# Patient Record
Sex: FEMALE | Race: WHITE | NOT HISPANIC OR LATINO | Employment: PART TIME | URBAN - METROPOLITAN AREA
[De-identification: names, ages, dates, MRNs, and addresses within clinical notes are randomized per-mention and may not be internally consistent; named-entity substitution may affect disease eponyms.]

---

## 2022-04-22 ENCOUNTER — OFFICE VISIT (OUTPATIENT)
Dept: OBGYN CLINIC | Facility: CLINIC | Age: 22
End: 2022-04-22
Payer: COMMERCIAL

## 2022-04-22 VITALS — DIASTOLIC BLOOD PRESSURE: 82 MMHG | SYSTOLIC BLOOD PRESSURE: 118 MMHG | WEIGHT: 169 LBS

## 2022-04-22 DIAGNOSIS — Z30.011 ENCOUNTER FOR INITIAL PRESCRIPTION OF CONTRACEPTIVE PILLS: Primary | ICD-10-CM

## 2022-04-22 PROCEDURE — 99241 PR OFFICE CONSULTATION NEW/ESTAB PATIENT 15 MIN: CPT | Performed by: NURSE PRACTITIONER

## 2022-04-22 RX ORDER — NORETHINDRONE ACETATE AND ETHINYL ESTRADIOL 1MG-20(21)
1 KIT ORAL DAILY
Qty: 84 TABLET | Refills: 0 | Status: SHIPPED | OUTPATIENT
Start: 2022-04-22 | End: 2022-07-07 | Stop reason: SDUPTHER

## 2022-04-22 NOTE — ASSESSMENT & PLAN NOTE
Reviewed birth control options including OCP, NuvaRing, Patch, Depo provera, Nexplanon IUD  Reviewed when to start  What to do if misses pill  Recommended condom use for STD protection and back up method for at least first month after starting pill or if misses more than 2 pills in the pack  Reviewed common side effects of pill including N/V, HA, Breast Pain, Weight gain, bloating, mood swings  Reassured side effects diminished after first month or two on the pill  Reviewed clotting risk and S/S of PE, DVT, MI, and stroke    Rx for OCP Junel 1/20 Fe sent to pharmacy  RTO 3 months for pill check or sooner as needed

## 2022-04-22 NOTE — PROGRESS NOTES
Assessment/Plan:    Encounter for initial prescription of contraceptive pills  Reviewed birth control options including OCP, NuvaRing, Patch, Depo provera, Nexplanon IUD  Reviewed when to start  What to do if misses pill  Recommended condom use for STD protection and back up method for at least first month after starting pill or if misses more than 2 pills in the pack  Reviewed common side effects of pill including N/V, HA, Breast Pain, Weight gain, bloating, mood swings  Reassured side effects diminished after first month or two on the pill  Reviewed clotting risk and S/S of PE, DVT, MI, and stroke  Rx for OCP Junel 1/20 Fe sent to pharmacy  RTO 3 months for pill check or sooner as needed       Diagnoses and all orders for this visit:    Encounter for initial prescription of contraceptive pills  -     norethindrone-ethinyl estradiol (JUNEL FE 1/20) 1-20 MG-MCG per tablet; Take 1 tablet by mouth daily          Subjective:      Patient ID: Monika Vallecillo is a 23 y o  female  Pt presents for contraceptive discussion  LMP: 4/21/2022  Menarche age 17  Period Cycle (Days): 29  Period Duration (Days): 7  Period Pattern: Regular  Menstrual Flow: Heavy  Menstrual Control: Tampon,Maxi pad  Menstrual Control Change Freq (Hours): 4  Dysmenorrhea: (!) Severe  Dysmenorrhea Symptoms: Cramping,Headache    Menses occur monthly but are very heavy and painful  She takes tylenol or motrin with mild relief  She often gets a headache right before onset of menses  Most interested in contraception to help menses and for contraceptive benefit if she were to become sexually active  Denies sexual debut    Denies any GYN complaints    Feels safe at home  Denies any physical, emotional, or sexual abuse at home or school         The following portions of the patient's history were reviewed and updated as appropriate: allergies, current medications, past family history, past medical history, past social history, past surgical history and problem list     Review of Systems   Genitourinary: Negative  Objective:      /82 (BP Location: Right arm, Patient Position: Sitting, Cuff Size: Large)   Wt 76 7 kg (169 lb)   LMP 04/21/2022          Physical Exam  Vitals and nursing note reviewed  Constitutional:       Appearance: Normal appearance  She is normal weight  Cardiovascular:      Rate and Rhythm: Normal rate and regular rhythm  Pulses: Normal pulses  Heart sounds: Normal heart sounds  Pulmonary:      Effort: Pulmonary effort is normal       Breath sounds: Normal breath sounds  Abdominal:      General: Abdomen is flat  Bowel sounds are normal       Palpations: Abdomen is soft  Musculoskeletal:         General: Normal range of motion  Skin:     General: Skin is warm and dry  Neurological:      Mental Status: She is alert and oriented to person, place, and time  Psychiatric:         Mood and Affect: Mood normal          Behavior: Behavior normal          Thought Content:  Thought content normal          Judgment: Judgment normal

## 2022-07-07 DIAGNOSIS — Z30.011 ENCOUNTER FOR INITIAL PRESCRIPTION OF CONTRACEPTIVE PILLS: ICD-10-CM

## 2022-07-07 RX ORDER — NORETHINDRONE ACETATE AND ETHINYL ESTRADIOL 1MG-20(21)
1 KIT ORAL DAILY
Qty: 84 TABLET | Refills: 0 | Status: SHIPPED | OUTPATIENT
Start: 2022-07-07 | End: 2022-07-22 | Stop reason: ALTCHOICE

## 2022-07-22 ENCOUNTER — OFFICE VISIT (OUTPATIENT)
Dept: OBGYN CLINIC | Facility: CLINIC | Age: 22
End: 2022-07-22
Payer: COMMERCIAL

## 2022-07-22 VITALS — SYSTOLIC BLOOD PRESSURE: 120 MMHG | DIASTOLIC BLOOD PRESSURE: 80 MMHG | WEIGHT: 174 LBS

## 2022-07-22 DIAGNOSIS — Z30.41 ENCOUNTER FOR SURVEILLANCE OF CONTRACEPTIVE PILLS: Primary | ICD-10-CM

## 2022-07-22 PROBLEM — Z30.011 ENCOUNTER FOR INITIAL PRESCRIPTION OF CONTRACEPTIVE PILLS: Status: RESOLVED | Noted: 2022-04-22 | Resolved: 2022-07-22

## 2022-07-22 PROCEDURE — 99213 OFFICE O/P EST LOW 20 MIN: CPT | Performed by: NURSE PRACTITIONER

## 2022-07-22 RX ORDER — ACETAMINOPHEN AND CODEINE PHOSPHATE 120; 12 MG/5ML; MG/5ML
1 SOLUTION ORAL DAILY
Qty: 90 TABLET | Refills: 3 | Status: SHIPPED | OUTPATIENT
Start: 2022-07-22

## 2022-07-22 NOTE — PROGRESS NOTES
Assessment/Plan:    Encounter for surveillance of contraceptive pills  Discussed risk of migraines with aura and combintion contraceptives  Will switch to progesterone only contraceptive  Reviewed progesterone only contraceptives  Discussed progesterone only pill, Depo Provera injection, Nexplanon or IUD  Reviewed when to start  What to do if misses pill  Recommended condom use for STD protection and back up method for at least first month after starting pill or if misses more than 2 pills in the pack  Reviewed common side effects of pill including N/V, HA, Breast Pain, Weight gain, bloating, mood swings  Reassured side effects diminished after first month or two on the pill  Rx for Progesterone only pill Ortho Micronor sent to pharmacy  RTO annual exam or sooner as needed  Rx for          Diagnoses and all orders for this visit:    Encounter for surveillance of contraceptive pills  -     norethindrone (Ortho Micronor) 0 35 MG tablet; Take 1 tablet (0 35 mg total) by mouth daily          Subjective:      Patient ID: Victorina Washington is a 23 y o  female  Pt presents for pill check  Pt states she was advised by her PCP to switch due to onset of migraines with aura  She states she had a history of migraines, but more recently started with the aura  Never had an aura previously  States she gets migraines with visual changes, but seems to have daily headaches  She liked the pill format  She is unsure if her stomach issues are from pill or not  She has been having a lot of stomach pains since starting the pill  Denies any other side effects  Menses are monthly, regular since starting OCP  Cramps remain the same  Does not have any issues remembering to take it             The following portions of the patient's history were reviewed and updated as appropriate: allergies, current medications, past family history, past medical history, past social history, past surgical history and problem list     Review of Systems   Genitourinary: Negative  Objective:    /80 (BP Location: Right arm, Patient Position: Sitting, Cuff Size: Standard)   Wt 78 9 kg (174 lb)   LMP 07/12/2022 (Approximate)      Physical Exam  Vitals and nursing note reviewed  Constitutional:       Appearance: Normal appearance  She is normal weight  Cardiovascular:      Rate and Rhythm: Normal rate and regular rhythm  Pulses: Normal pulses  Heart sounds: Normal heart sounds  Pulmonary:      Effort: Pulmonary effort is normal       Breath sounds: Normal breath sounds  Abdominal:      General: Abdomen is flat  Bowel sounds are normal       Palpations: Abdomen is soft  Musculoskeletal:         General: Normal range of motion  Skin:     General: Skin is warm and dry  Neurological:      Mental Status: She is alert and oriented to person, place, and time  Psychiatric:         Mood and Affect: Mood normal          Behavior: Behavior normal          Thought Content:  Thought content normal          Judgment: Judgment normal

## 2022-07-22 NOTE — ASSESSMENT & PLAN NOTE
Discussed risk of migraines with aura and combintion contraceptives  Will switch to progesterone only contraceptive  Reviewed progesterone only contraceptives  Discussed progesterone only pill, Depo Provera injection, Nexplanon or IUD  Reviewed when to start  What to do if misses pill  Recommended condom use for STD protection and back up method for at least first month after starting pill or if misses more than 2 pills in the pack  Reviewed common side effects of pill including N/V, HA, Breast Pain, Weight gain, bloating, mood swings  Reassured side effects diminished after first month or two on the pill     Rx for Progesterone only pill Ortho Micronor sent to pharmacy  RTO annual exam or sooner as needed  Rx for

## 2022-07-25 ENCOUNTER — TELEPHONE (OUTPATIENT)
Dept: OBGYN CLINIC | Facility: CLINIC | Age: 22
End: 2022-07-25

## 2023-04-08 ENCOUNTER — OFFICE VISIT (OUTPATIENT)
Dept: FAMILY MEDICINE CLINIC | Facility: CLINIC | Age: 23
End: 2023-04-08

## 2023-04-08 VITALS
OXYGEN SATURATION: 99 % | HEART RATE: 91 BPM | WEIGHT: 197 LBS | BODY MASS INDEX: 32.82 KG/M2 | DIASTOLIC BLOOD PRESSURE: 80 MMHG | TEMPERATURE: 98.1 F | HEIGHT: 65 IN | SYSTOLIC BLOOD PRESSURE: 120 MMHG | RESPIRATION RATE: 16 BRPM

## 2023-04-08 DIAGNOSIS — G47.00 INSOMNIA, UNSPECIFIED TYPE: ICD-10-CM

## 2023-04-08 DIAGNOSIS — R53.83 OTHER FATIGUE: ICD-10-CM

## 2023-04-08 DIAGNOSIS — Z13.220 SCREENING CHOLESTEROL LEVEL: ICD-10-CM

## 2023-04-08 DIAGNOSIS — G43.109 MIGRAINE WITH AURA AND WITHOUT STATUS MIGRAINOSUS, NOT INTRACTABLE: Primary | ICD-10-CM

## 2023-04-08 DIAGNOSIS — R19.7 DIARRHEA, UNSPECIFIED TYPE: ICD-10-CM

## 2023-04-08 NOTE — PROGRESS NOTES
Olga Quesada 2000 female MRN: [id number]    FAMILY PRACTICE OFFICE VISIT  [facility] - [address]      ASSESSMENT/PLAN  Olga Quesada is a 26 y o  female presents to the office for    Diagnoses and all orders for this visit:    Migraine with aura and without status migrainosus, not intractable  -     Vitamin D 25 hydroxy; Future  -     Comprehensive metabolic panel; Future  -     CBC and differential; Future  -     TSH, 3rd generation with Free T4 reflex; Future  -     Iron, TIBC and Ferritin Panel; Future    Diarrhea, unspecified type  -     Ambulatory Referral to Allergy; Future  -     Ambulatory Referral to Gastroenterology; Future  -     Vitamin D 25 hydroxy; Future  -     Comprehensive metabolic panel; Future  -     CBC and differential; Future  -     TSH, 3rd generation with Free T4 reflex; Future    Other fatigue  -     Vitamin D 25 hydroxy; Future  -     Comprehensive metabolic panel; Future  -     CBC and differential; Future    Insomnia, unspecified type  -     Vitamin D 25 hydroxy; Future  -     Comprehensive metabolic panel; Future  -     CBC and differential; Future    Screening cholesterol level  -     Lipid panel; Future     Recommend allergy testing  Recommend GI specialist  IBS versus anxiety  Recommend ZzzQuil for 7 days straight to see if this helps put the patient back in a normal sleep cycle  Migraines would like her to start magnesium however will likely worsen her diarrhea therefore hold at this time  Recommend increasing fiber intake; by 1 tablespoon of Metamucil daily  BMI Counseling: Body mass index is 33 04 kg/m²  The BMI is above normal  Exercise recommendations include exercising 3-5 times per week  Rationale for BMI follow-up plan is due to patient being overweight or obese  Depression Screening and Follow-up Plan: Patient was screened for depression during today's encounter   They screened negative with a PHQ-2 score of 0           Future Appointments   Date Time Provider Kailee Calderon   8/31/2023  3:00 PM MD [facility]          SUBJECTIVE  CC: Establish Care (Stomach issues, diarrhea and nausea, sleeping issues, allergy testing )      HPI:  Chana Chavez is a 26 y o  female who presents to the office to establish care  Patient has had ongoing stomach issues recently  Patient felt like it was secondary to dairy products but then sometimes is able to tolerate them  Patient at times it has to be in the bathroom for at least 2 hours after being in a restaurant  Patient has had diarrhea nausea but with no vomiting  Patient has had a history of migraines  Leading to them switching her birth control  Patient states that her sleep cycle has been off  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 and feels like it might be secondary to that  Patient tends to go to bed very late and sleeps most of the day has tried sleep bears and ZzzQuil with minimal relief  Sometimes feels groggy    Review of Systems   Constitutional: Positive for fatigue  Negative for activity change, appetite change, chills and fever  HENT: Negative for congestion  Respiratory: Negative for cough, chest tightness and shortness of breath  Cardiovascular: Negative for chest pain and leg swelling  Gastrointestinal: Positive for diarrhea and nausea  Negative for abdominal distention, abdominal pain, constipation and vomiting  Neurological: Positive for headaches  Psychiatric/Behavioral: Positive for sleep disturbance  The patient is nervous/anxious  All other systems reviewed and are negative        Historical Information   The patient history was reviewed as follows:  Past Medical History:   Diagnosis Date   • Migraine          Medications:     Current Outpatient Medications:   •  norethindrone (Ortho Micronor) 0 35 MG tablet, Take 1 tablet (0 35 mg total) by mouth daily, Disp: 90 tablet, Rfl: 3    Allergies "  Allergen Reactions   • Penicillins Swelling       OBJECTIVE  Vitals:   Vitals:    04/08/23 0801   BP: 120/80   BP Location: Left arm   Patient Position: Sitting   Cuff Size: Large   Pulse: 91   Resp: 16   Temp: 98 1 °F (36 7 °C)   SpO2: 99%   Weight: 89 4 kg (197 lb)   Height: 5' 4 75\" (1 645 m)         Physical Exam               John Abernathy MD,   [facility]  4/8/2023      "

## 2023-05-27 LAB
25(OH)D3+25(OH)D2 SERPL-MCNC: 20.7 NG/ML (ref 30–100)
ALBUMIN SERPL-MCNC: 4.4 G/DL (ref 3.9–5)
ALBUMIN/GLOB SERPL: 1.8 {RATIO} (ref 1.2–2.2)
ALP SERPL-CCNC: 72 IU/L (ref 44–121)
ALT SERPL-CCNC: 20 IU/L (ref 0–32)
AST SERPL-CCNC: 17 IU/L (ref 0–40)
BASOPHILS # BLD AUTO: 0 X10E3/UL (ref 0–0.2)
BASOPHILS NFR BLD AUTO: 1 %
BILIRUB SERPL-MCNC: 0.3 MG/DL (ref 0–1.2)
BUN SERPL-MCNC: 10 MG/DL (ref 6–20)
BUN/CREAT SERPL: 13 (ref 9–23)
CALCIUM SERPL-MCNC: 9.6 MG/DL (ref 8.7–10.2)
CHLORIDE SERPL-SCNC: 100 MMOL/L (ref 96–106)
CHOLEST SERPL-MCNC: 188 MG/DL (ref 100–199)
CO2 SERPL-SCNC: 21 MMOL/L (ref 20–29)
CREAT SERPL-MCNC: 0.8 MG/DL (ref 0.57–1)
EGFR: 107 ML/MIN/1.73
EOSINOPHIL # BLD AUTO: 0.1 X10E3/UL (ref 0–0.4)
EOSINOPHIL NFR BLD AUTO: 2 %
ERYTHROCYTE [DISTWIDTH] IN BLOOD BY AUTOMATED COUNT: 12.3 % (ref 11.7–15.4)
FERRITIN SERPL-MCNC: 72 NG/ML (ref 15–150)
GLOBULIN SER-MCNC: 2.5 G/DL (ref 1.5–4.5)
GLUCOSE SERPL-MCNC: 91 MG/DL (ref 70–99)
HCT VFR BLD AUTO: 40.9 % (ref 34–46.6)
HDLC SERPL-MCNC: 76 MG/DL
HGB BLD-MCNC: 13.9 G/DL (ref 11.1–15.9)
IMM GRANULOCYTES # BLD: 0 X10E3/UL (ref 0–0.1)
IMM GRANULOCYTES NFR BLD: 0 %
IRON SATN MFR SERPL: 16 % (ref 15–55)
IRON SERPL-MCNC: 54 UG/DL (ref 27–159)
LDLC SERPL CALC-MCNC: 91 MG/DL (ref 0–99)
LYMPHOCYTES # BLD AUTO: 1.6 X10E3/UL (ref 0.7–3.1)
LYMPHOCYTES NFR BLD AUTO: 32 %
MCH RBC QN AUTO: 32.3 PG (ref 26.6–33)
MCHC RBC AUTO-ENTMCNC: 34 G/DL (ref 31.5–35.7)
MCV RBC AUTO: 95 FL (ref 79–97)
MICRODELETION SYND BLD/T FISH: NORMAL
MONOCYTES # BLD AUTO: 0.7 X10E3/UL (ref 0.1–0.9)
MONOCYTES NFR BLD AUTO: 14 %
NEUTROPHILS # BLD AUTO: 2.5 X10E3/UL (ref 1.4–7)
NEUTROPHILS NFR BLD AUTO: 51 %
PLATELET # BLD AUTO: 225 X10E3/UL (ref 150–450)
POTASSIUM SERPL-SCNC: 4.4 MMOL/L (ref 3.5–5.2)
PROT SERPL-MCNC: 6.9 G/DL (ref 6–8.5)
RBC # BLD AUTO: 4.31 X10E6/UL (ref 3.77–5.28)
SL AMB VLDL CHOLESTEROL CALC: 21 MG/DL (ref 5–40)
SODIUM SERPL-SCNC: 136 MMOL/L (ref 134–144)
TIBC SERPL-MCNC: 329 UG/DL (ref 250–450)
TRIGL SERPL-MCNC: 120 MG/DL (ref 0–149)
TSH SERPL DL<=0.005 MIU/L-ACNC: 1.76 UIU/ML (ref 0.45–4.5)
UIBC SERPL-MCNC: 275 UG/DL (ref 131–425)
WBC # BLD AUTO: 4.9 X10E3/UL (ref 3.4–10.8)

## 2023-07-11 DIAGNOSIS — Z30.41 ENCOUNTER FOR SURVEILLANCE OF CONTRACEPTIVE PILLS: ICD-10-CM

## 2023-07-12 RX ORDER — NORETHINDRONE 0.35 MG/1
TABLET ORAL
Qty: 84 TABLET | Refills: 1 | Status: SHIPPED | OUTPATIENT
Start: 2023-07-12 | End: 2023-08-15 | Stop reason: SDUPTHER

## 2023-08-02 ENCOUNTER — OFFICE VISIT (OUTPATIENT)
Dept: FAMILY MEDICINE CLINIC | Facility: CLINIC | Age: 23
End: 2023-08-02
Payer: COMMERCIAL

## 2023-08-02 VITALS
WEIGHT: 204 LBS | HEART RATE: 81 BPM | DIASTOLIC BLOOD PRESSURE: 80 MMHG | OXYGEN SATURATION: 98 % | BODY MASS INDEX: 33.99 KG/M2 | HEIGHT: 65 IN | RESPIRATION RATE: 16 BRPM | SYSTOLIC BLOOD PRESSURE: 120 MMHG | TEMPERATURE: 97.6 F

## 2023-08-02 DIAGNOSIS — Z00.00 PHYSICAL EXAM: Primary | ICD-10-CM

## 2023-08-02 DIAGNOSIS — K58.9 IRRITABLE BOWEL SYNDROME WITHOUT DIARRHEA: ICD-10-CM

## 2023-08-02 DIAGNOSIS — G43.109 MIGRAINE WITH AURA AND WITHOUT STATUS MIGRAINOSUS, NOT INTRACTABLE: ICD-10-CM

## 2023-08-02 PROCEDURE — 99395 PREV VISIT EST AGE 18-39: CPT | Performed by: FAMILY MEDICINE

## 2023-08-02 RX ORDER — TRIAMCINOLONE ACETONIDE 55 UG/1
SPRAY, METERED NASAL DAILY
COMMUNITY

## 2023-08-02 NOTE — PROGRESS NOTES
[facility]    NAME: Marly Randall  AGE: 26 y.o. SEX: female  : 2000     DATE: 2023     Assessment and Plan:     Problem List Items Addressed This Visit        Cardiovascular and Mediastinum    Migraine with aura and without status migrainosus, not intractable   Other Visit Diagnoses     Physical exam    -  Primary    Irritable bowel syndrome without diarrhea          Recommend Amitriptyline-> IBS symptoms and Migraines  START Vit D3 2000 units  Start Allegra D next Tuesday for 1 week, then Allegra daily till fall is over    Unable to start sooner given that the patient is going to be going for allergy testing        Depression Screening and Follow-up Plan: Patient was screened for depression during today's encounter. They screened negative with a PHQ-2 score of 0. No follow-ups on file. Chief Complaint:     Chief Complaint   Patient presents with   • Physical Exam     Sore throat x's 1 month       History of Present Illness:     Adult Annual Physical   Patient here for a comprehensive physical exam.   Sensitive to shrimp per allergist; will be going for allergy testing next Tuesday unable to take any antihistamine. 3 weeks, 9pm at night and goes away in afternoon. Patient states that she has been having congestion and sore throat and a postnasal drip  OTC has not been able to do anything given allergy testing that is pending next week  Diet and Physical Activity  Diet/Nutrition: well balanced diet. Exercise: no formal exercise. Depression Screening  PHQ-2/9 Depression Screening    Little interest or pleasure in doing things: 0 - not at all  Feeling down, depressed, or hopeless: 0 - not at all  PHQ-2 Score: 0  PHQ-2 Interpretation: Negative depression screen       General Health  Sleep: sleeps well and gets 7-8 hours of sleep on average. Hearing: normal - bilateral.  Vision: no vision problems. Dental: regular dental visits. /GYN Health  Last menstrual period: THe day before , does have migraines prior to it. Review of Systems:     Review of Systems   Constitutional: Negative for activity change, appetite change, chills, fatigue and fever. HENT: Positive for congestion, postnasal drip and sore throat. Respiratory: Negative for cough, chest tightness and shortness of breath. Cardiovascular: Negative for chest pain and leg swelling. Gastrointestinal: Negative for abdominal distention, abdominal pain, constipation, diarrhea, nausea and vomiting. All other systems reviewed and are negative. Past Medical History:     Past Medical History:   Diagnosis Date   • Headache(784.0) 2009   • Migraine       Past Surgical History:     History reviewed. No pertinent surgical history.    Social History:     Social History     Socioeconomic History   • Marital status: Single     Spouse name: None   • Number of children: None   • Years of education: None   • Highest education level: None   Occupational History   • None   Tobacco Use   • Smoking status: Never   • Smokeless tobacco: Never   Vaping Use   • Vaping Use: Never used   Substance and Sexual Activity   • Alcohol use: Not Currently     Comment: occasionally   • Drug use: Never   • Sexual activity: Never   Other Topics Concern   • None   Social History Narrative   • None     Social Determinants of Health     Financial Resource Strain: Not on file   Food Insecurity: Not on file   Transportation Needs: Not on file   Physical Activity: Not on file   Stress: Not on file   Social Connections: Not on file   Intimate Partner Violence: Not on file   Housing Stability: Not on file      Family History:     Family History   Problem Relation Age of Onset   • Melanoma Mother    • Arthritis Mother    • Cancer Mother    • Asthma Father    • Allergies Father         seasonal   • Arthritis Father    • Endometriosis Maternal Aunt    • Endometriosis Paternal Aunt    • Lung cancer Maternal Grandmother    • Uterine cancer Maternal Grandmother    • Stroke Maternal Grandmother    • Diabetes Maternal Aunt       Current Medications:     Current Outpatient Medications   Medication Sig Dispense Refill   • Gayle 0.35 MG tablet TAKE 1 TABLET DAILY 84 tablet 1   • Triamcinolone Acetonide (Nasacort Allergy) 55 MCG/ACT nasal spray by Each Nare route daily       No current facility-administered medications for this visit. Allergies:     No Known Allergies   Physical Exam:     /80 (BP Location: Left arm, Patient Position: Sitting, Cuff Size: Large)   Pulse 81   Temp 97.6 °F (36.4 °C)   Resp 16   Ht 5' 5" (1.651 m)   Wt 92.5 kg (204 lb)   SpO2 98%   BMI 33.95 kg/m²     Physical Exam  Vitals reviewed. Constitutional:       Appearance: Normal appearance. She is well-developed. HENT:      Head: Normocephalic and atraumatic. Right Ear: Ear canal and external ear normal. A middle ear effusion is present. There is no impacted cerumen. Left Ear: Ear canal and external ear normal. A middle ear effusion is present. There is no impacted cerumen. Nose: Nose normal.      Mouth/Throat:      Mouth: Mucous membranes are moist.      Pharynx: Oropharynx is clear. Posterior oropharyngeal erythema present. Eyes:      Conjunctiva/sclera: Conjunctivae normal.      Pupils: Pupils are equal, round, and reactive to light. Cardiovascular:      Rate and Rhythm: Normal rate and regular rhythm. Heart sounds: Normal heart sounds. Pulmonary:      Effort: Pulmonary effort is normal.      Breath sounds: Normal breath sounds. Abdominal:      General: Abdomen is flat. Bowel sounds are normal.      Palpations: Abdomen is soft. Musculoskeletal:         General: Normal range of motion. Cervical back: Normal range of motion and neck supple. Skin:     General: Skin is warm. Capillary Refill: Capillary refill takes less than 2 seconds.    Neurological: General: No focal deficit present. Mental Status: She is alert and oriented to person, place, and time. Mental status is at baseline. Psychiatric:         Mood and Affect: Mood normal.         Behavior: Behavior normal.         Thought Content:  Thought content normal.         Judgment: Judgment normal.          Joss Helms MD   [facility]

## 2023-08-02 NOTE — PATIENT INSTRUCTIONS
Recommend Amitriptyline-> IBS symptoms and Migraines  START Vit D3 2000 units  Start Allegra D next Tuesday for 1 week, then Allegra daily till fall is over

## 2023-08-15 DIAGNOSIS — Z30.41 ENCOUNTER FOR SURVEILLANCE OF CONTRACEPTIVE PILLS: ICD-10-CM

## 2023-08-15 RX ORDER — ACETAMINOPHEN AND CODEINE PHOSPHATE 120; 12 MG/5ML; MG/5ML
1 SOLUTION ORAL DAILY
Qty: 84 TABLET | Refills: 1 | Status: SHIPPED | OUTPATIENT
Start: 2023-08-15

## 2023-12-08 ENCOUNTER — TELEMEDICINE (OUTPATIENT)
Dept: FAMILY MEDICINE CLINIC | Facility: CLINIC | Age: 23
End: 2023-12-08
Payer: COMMERCIAL

## 2023-12-08 DIAGNOSIS — J32.9 SINUSITIS, UNSPECIFIED CHRONICITY, UNSPECIFIED LOCATION: Primary | ICD-10-CM

## 2023-12-08 PROCEDURE — 99213 OFFICE O/P EST LOW 20 MIN: CPT | Performed by: FAMILY MEDICINE

## 2023-12-08 RX ORDER — AMOXICILLIN AND CLAVULANATE POTASSIUM 875; 125 MG/1; MG/1
1 TABLET, FILM COATED ORAL EVERY 12 HOURS SCHEDULED
Qty: 14 TABLET | Refills: 0 | Status: SHIPPED | OUTPATIENT
Start: 2023-12-08 | End: 2023-12-15

## 2023-12-08 NOTE — PROGRESS NOTES
Virtual Regular Visit    Verification of patient location:    Patient is located at Home in the following state in which I hold an active license [place]      Assessment/Plan:    Problem List Items Addressed This Visit    None  Visit Diagnoses       Sinusitis, unspecified chronicity, unspecified location    -  Primary    Relevant Medications    amoxicillin-clavulanate (AUGMENTIN) 875-125 mg per tablet          Sinus Infection:  - Started on Augment BID x 7days  - Use Flonase 1 spray in each nostril for 7 days  -Continue taking Claritin, Zyrtec, or Allegra    - Encourage to take hot shower to help with congestion. If symptoms worsen to please contact the office. Reason for visit is   Chief Complaint   Patient presents with    Virtual Regular Visit        Encounter provider Luana Avila MD    Provider located at [address] [place] [id number]      Recent Visits  No visits were found meeting these conditions. Showing recent visits within past 7 days and meeting all other requirements  Today's Visits  Date Type Provider Dept   12/08/23 Telemedicine Shasta Mcfadden MD [facility] [facility] today's visits and meeting all other requirements  Future Appointments  No visits were found meeting these conditions. Showing future appointments within next 150 days and meeting all other requirements       The patient was identified by name and date of birth. Aracely Glez was informed that this is a telemedicine visit and that the visit is being conducted through the [address] [place] [address] [employer] platform. She agrees to proceed. .  My office door was closed. No one else was in the room. She acknowledged consent and understanding of privacy and security of the video platform. The patient has agreed to participate and understands they can discontinue the visit at any time. Patient is aware this is a billable service.      Subjective  Brie N Merle Quintana is a 26 y.o. female presents via video. Sinus infection symptoms, can only sleep with sitting up. Coughing up phlegm, nasal drips, sinus pressure and pain. Patient states over-the-counter medications have not been helping. Has been on it for almost a month with worsening symptoms      HPI     Past Medical History:   Diagnosis Date    Headache(784.0) 2009    Migraine        No past surgical history on file. Current Outpatient Medications   Medication Sig Dispense Refill    amoxicillin-clavulanate (AUGMENTIN) 875-125 mg per tablet Take 1 tablet by mouth every 12 (twelve) hours for 7 days 14 tablet 0    norethindrone (Gayle) 0.35 MG tablet Take 1 tablet (0.35 mg total) by mouth daily 84 tablet 1    ondansetron (ZOFRAN) 8 mg tablet Take 1 tablet (8 mg total) by mouth every 12 (twelve) hours as needed for nausea or vomiting 10 tablet 0    Triamcinolone Acetonide (Nasacort Allergy) 55 MCG/ACT nasal spray by Each Nare route daily       No current facility-administered medications for this visit. No Known Allergies    Review of Systems   Constitutional:  Negative for activity change, appetite change, chills, fatigue and fever. HENT:  Positive for congestion, dental problem, postnasal drip, sinus pressure and sinus pain. Respiratory:  Positive for cough. Negative for chest tightness and shortness of breath. Cardiovascular:  Negative for chest pain and leg swelling. Gastrointestinal:  Negative for abdominal distention, abdominal pain, constipation, diarrhea, nausea and vomiting. All other systems reviewed and are negative. Video Exam    There were no vitals filed for this visit. Physical Exam  Constitutional:       General: She is not in acute distress. Appearance: She is well-developed. She is not ill-appearing. HENT:      Nose: Congestion present. Pulmonary:      Effort: Pulmonary effort is normal.   Musculoskeletal:         General: Normal range of motion.    Skin: Capillary Refill: Capillary refill takes less than 2 seconds. Neurological:      Mental Status: She is alert and oriented to person, place, and time. Psychiatric:         Behavior: Behavior normal.         Thought Content:  Thought content normal.         Judgment: Judgment normal.          Visit Time  Total Visit Duration: 15

## 2023-12-26 ENCOUNTER — ANNUAL EXAM (OUTPATIENT)
Dept: OBGYN CLINIC | Facility: CLINIC | Age: 23
End: 2023-12-26
Payer: COMMERCIAL

## 2023-12-26 VITALS
SYSTOLIC BLOOD PRESSURE: 116 MMHG | HEIGHT: 65 IN | WEIGHT: 211 LBS | BODY MASS INDEX: 35.16 KG/M2 | DIASTOLIC BLOOD PRESSURE: 80 MMHG

## 2023-12-26 DIAGNOSIS — Z30.41 ENCOUNTER FOR SURVEILLANCE OF CONTRACEPTIVE PILLS: ICD-10-CM

## 2023-12-26 DIAGNOSIS — Z01.419 WOMEN'S ANNUAL ROUTINE GYNECOLOGICAL EXAMINATION: Primary | ICD-10-CM

## 2023-12-26 PROCEDURE — S0612 ANNUAL GYNECOLOGICAL EXAMINA: HCPCS | Performed by: NURSE PRACTITIONER

## 2023-12-26 RX ORDER — ACETAMINOPHEN AND CODEINE PHOSPHATE 120; 12 MG/5ML; MG/5ML
1 SOLUTION ORAL DAILY
Qty: 84 TABLET | Refills: 3 | Status: SHIPPED | OUTPATIENT
Start: 2023-12-26 | End: 2023-12-28 | Stop reason: SDUPTHER

## 2023-12-26 NOTE — PROGRESS NOTES
"  Subjective    HPI:     Brie Randall is a 23 y.o. nulliparous female. Her menstrual cycles are regular. Her current method of contraception includes  POP . She is virginal. She denies /GI and Gyn complaints. She feels safe at home. She denies depression/anxiety.  Medical, surgical and family history reviewed. Her dental care is up-to-date. She tries to eat a healthy diet and exercises when she can.     Visit Vitals  /80 (BP Location: Right arm, Patient Position: Sitting)   Ht 5' 5\" (1.651 m)   Wt 95.7 kg (211 lb)   LMP 2023 (Approximate)   BMI 35.11 kg/m²   OB Status Birth Control   Smoking Status Never   BSA 2.02 m²       Gynecologic History    Patient's last menstrual period was 2023 (approximate).    Gardasil Vaccine Series: did not complete. Education provided.    Obstetric and Medical History    OB History    Para Term  AB Living   0 0 0 0 0 0   SAB IAB Ectopic Multiple Live Births   0 0 0 0 0       Past Medical History:   Diagnosis Date    Headache(784.0) 2009    Migraine        No past surgical history on file.    The following portions of the patient's history were reviewed and updated as appropriate: allergies, current medications, past family history, past medical history, past social history, past surgical history, and problem list.    Review of Systems    Pertinent items are noted in HPI.      Objective    Physical Exam  Constitutional:       Appearance: Normal appearance. She is well-developed.   Genitourinary:      Vulva, bladder and urethral meatus normal.      No lesions in the vagina.      Right Labia: No rash, tenderness, lesions, skin changes or Bartholin's cyst.     Left Labia: No tenderness, lesions, skin changes, Bartholin's cyst or rash.     No labial fusion noted.      No inguinal adenopathy present in the right or left side.     No vaginal discharge, erythema, tenderness, bleeding or granulation tissue.      No vaginal prolapse present.     No vaginal " atrophy present.       Right Adnexa: not tender, not full and no mass present.     Left Adnexa: not tender, not full and no mass present.     Cervix is nulliparous.      No cervical motion tenderness, discharge, friability, lesion, polyp or nabothian cyst.      Uterus is not enlarged, tender, irregular or prolapsed.      No uterine mass detected.     Uterus is anteverted.      Pelvic exam was performed with patient in the lithotomy position.   Breasts:     Breasts are symmetrical.      Right: No inverted nipple, mass, nipple discharge, skin change or tenderness.      Left: No inverted nipple, mass, nipple discharge, skin change or tenderness.   HENT:      Head: Normocephalic and atraumatic.   Neck:      Thyroid: No thyromegaly.   Cardiovascular:      Rate and Rhythm: Normal rate and regular rhythm.      Heart sounds: Normal heart sounds, S1 normal and S2 normal.   Pulmonary:      Effort: Pulmonary effort is normal.      Breath sounds: Normal breath sounds.   Abdominal:      General: Bowel sounds are normal. There is no distension.      Palpations: Abdomen is soft. There is no mass.      Tenderness: There is no abdominal tenderness. There is no guarding.      Hernia: There is no hernia in the left inguinal area or right inguinal area.   Musculoskeletal:      Cervical back: Neck supple.   Lymphadenopathy:      Cervical: No cervical adenopathy.      Upper Body:      Right upper body: No supraclavicular or axillary adenopathy.      Left upper body: No supraclavicular or axillary adenopathy.      Lower Body: No right inguinal adenopathy. No left inguinal adenopathy.   Neurological:      Mental Status: She is alert.   Skin:     General: Skin is warm and dry.      Findings: No rash.   Psychiatric:         Attention and Perception: Attention and perception normal.         Mood and Affect: Mood and affect normal.         Speech: Speech normal.         Behavior: Behavior is cooperative.         Thought Content: Thought  content normal.         Cognition and Memory: Cognition and memory normal.         Judgment: Judgment normal.   Vitals and nursing note reviewed.          Assessment and Plan    Brie was seen today for gynecologic exam.    Diagnoses and all orders for this visit:    Women's annual routine gynecological examination    Encounter for surveillance of contraceptive pills  -     norethindrone (Gayle) 0.35 MG tablet; Take 1 tablet (0.35 mg total) by mouth daily      Patient informed of a Stable GYN exam. A pap smear was performed.     I have discussed the importance of exercise and healthy diet as well as adequate intake of calcium and vitamin D. The current ASCCP guidelines were reviewed. The low risk patient will receive pap smear screening every 3 years until the age of 29 and then every 3 to 5 years with HPV co-testing from the ages of 30-65. I emphasized the importance of an annual pelvic and breast exam. A yearly mammogram is recommended for breast cancer screening starting at age 40.       Results will be released to [facility], if abnormal will call to review and discuss treatment plan.     All questions have been answered to her satisfaction.    HPV Vaccine education provided.   Contraception: POP and doing well.  Follow up in: 1 year or sooner if needed.

## 2023-12-28 DIAGNOSIS — Z30.41 ENCOUNTER FOR SURVEILLANCE OF CONTRACEPTIVE PILLS: ICD-10-CM

## 2023-12-28 RX ORDER — ACETAMINOPHEN AND CODEINE PHOSPHATE 120; 12 MG/5ML; MG/5ML
1 SOLUTION ORAL DAILY
Qty: 84 TABLET | Refills: 3 | Status: SHIPPED | OUTPATIENT
Start: 2024-01-01

## 2023-12-29 LAB
CYTOLOGIST CVX/VAG CYTO: NORMAL
DX ICD CODE: NORMAL
OTHER STN SPEC: NORMAL
OTHER STN SPEC: NORMAL
PATH REPORT.FINAL DX SPEC: NORMAL
SL AMB NOTE:: NORMAL
SL AMB SPECIMEN ADEQUACY: NORMAL
SL AMB TEST METHODOLOGY: NORMAL

## 2024-03-20 DIAGNOSIS — K58.9 IRRITABLE BOWEL SYNDROME WITHOUT DIARRHEA: Primary | ICD-10-CM

## 2024-03-26 ENCOUNTER — TELEPHONE (OUTPATIENT)
Dept: FAMILY MEDICINE CLINIC | Facility: CLINIC | Age: 24
End: 2024-03-26

## 2024-06-07 ENCOUNTER — CLINICAL SUPPORT (OUTPATIENT)
Dept: BARIATRICS | Facility: CLINIC | Age: 24
End: 2024-06-07

## 2024-06-07 VITALS — HEIGHT: 66 IN | WEIGHT: 208.6 LBS | BODY MASS INDEX: 33.52 KG/M2

## 2024-06-07 DIAGNOSIS — R63.5 ABNORMAL WEIGHT GAIN: Primary | ICD-10-CM

## 2024-06-07 PROCEDURE — WMDI30

## 2024-06-07 PROCEDURE — RECHECK

## 2024-06-07 NOTE — PROGRESS NOTES
"Weight Management Medical Nutrition Assessment    Pt presents today with her mom (mom is a [facility] pt also) for menu planning. Pt is currently home from college for the summer. 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 About 2 year ago went on a \"diet kick\" got down to 150 from 190#. Reviewed diet recall and pt appears to be skipping breakfast most mornings, when is at school is ordering out for most meals, is working and studying so has limited time for activity. Now that she is home for the summer she sleeps in late, skips breakfast, but has more time for activity and is eating out less than when at school, but these past few weeks there is more eating out than typical (about 4 times rather than 1-2).  Did some menu planning with pt working on 3 meals per day, protein at each meal, appropriate snacks spaced out between and incorporating activity. Pt and mom plan on supporting each other. Pt will f/u in 6 weeks before she goes back to school in August.      Patient seen by Medical Provider in past 6 months:  no  Requested to schedule appointment with Medical Provider: No    Anthropometric Measurements  Start Weight (#): 208.6#  Current Weight (#): 208.6#  TBW % Change from start weight:-  Ideal Body Weight (#):130#  Goal Weight (#):<160#    Weight Loss History  Previous weight loss attempts: Commercial Programs (Weight Watchers, Julianne Gildardo, etc.)  Exercise  Self Created Diets (Portion Control, Healthy Food Choices, etc.)    Food and Nutrition Related History  At home from college right now  Wake up: 10:30-11am now at home, at school up at 6am   Bed Time:varies    Food Recall  Breakfast:not a breakfast person    Snack:-  Lunch:1st meal of the day-left overs from dinner OR frozen meal OR wrap/sandwich--breaded chicken with veggies sometimes with goldfish or cheese its.   AT school does more ordering--[employer] OR smashburger OR sushi OR chinese OR Mexican (burrito OR " "bowl)  Snack:-  Dinner:  At home will be more chicken often--meat, starch, veggies OR will go out to eat more lately ([employer] or Cambodian),  When at school will be more of the take out/eating out.  Snack:popcorn or chips or cheese its or goldfish or sunchips or smores lately    Beverages: water, diet soda, and alcohol Volume of beverage intake:   Volume:  100+ oz water per day, alcohol:  winery when home from school, but at school maybe 1x per month because busy with school/work/studying    Weekends: Worse, more out to eat, alcohol  Cravings: salty  Trouble area of day:night time    Frequency of Eating out: weekends more often  Food restrictions: some lactose intolerance, no shrimp  Cooking: self and mom, when at school does more ordering out.   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 Thinks this next semester will be more \"regular\" schedule   Frequency: none  Physical limitations/barriers to exercise: schedule    Estimated Needs  Energy  Mack Guzman Energy Needs:   BMR : 1720    1-2# loss weekly sedentary:  3078-2018              1-2# loss weekly lightly active:8789-2391  Maintenance calories for sedentary activity level: 2064  Protein:  71-86g      (1.2-1.5g/kg IBW)  Fluid: 7503-8518     (35mL/kg IBW)    Nutrition Diagnosis  Yes;    Overweight/obesity  related to Excess energy intake as evidenced by  BMI more than normative standard for age and sex (obesity-grade I 30-34.9)       Nutrition Intervention    Nutrition Prescription  Calories:8051-0217  Protein:70-80g  Fluid:2000ml    Meal Plan (Kelby/Pro/Carb)  Breakfast:  300, 30gm  Snack: ---  Lunch:  275, 21gm  Snack: optional 150cal  Dinner:450, 21gm  Snack:   200 kelby, 0-5gm    Nutrition Education:    Healthy Core Manual  Calorie controlled menu  Lean protein " food choices  Healthy snack options  Food journaling tips      Nutrition Counseling:  Strategies: meal planning, portion sizes, healthy snack choices, hydration, fiber intake, protein intake, exercise, food journal      Monitoring and Evaluation:  Evaluation criteria:  Energy Intake  Meet protein needs  Maintain adequate hydration  Monitor weekly weight  Meal planning/preparation  Food journal   Decreased portions at mealtimes and snacks  Physical activity     Barriers to learning:none  Readiness to change: Preparation:  (Getting ready to change)   Comprehension: good  Expected Compliance: good

## 2024-07-15 ENCOUNTER — CLINICAL SUPPORT (OUTPATIENT)
Dept: BARIATRICS | Facility: CLINIC | Age: 24
End: 2024-07-15

## 2024-07-15 DIAGNOSIS — R63.5 ABNORMAL WEIGHT GAIN: Primary | ICD-10-CM

## 2024-07-15 PROCEDURE — RECHECK

## 2024-07-15 PROCEDURE — WMDI30

## 2024-07-15 NOTE — PROGRESS NOTES
Weight Management Medical Nutrition F/U menu planning    Pt presents today with her mom (mom is a [facility] pt also) for menu planning follow-up before leaving for college in August.  Pt presents today at at weigh tos 207.2# which is a 1.3# weight loss in the past month. She continues to struggle with eating out and eating late at night. 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  We discussed a way to get a 3rd meal in instead of eating late at night. 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  Discussed working on having 3 meals per day by using a protein shake for breakfast and came up with other easy meals she can make for lunch to bring to work with her.  Pt does have a lot of take out for dinner. Suggested some easy foods she can keep at home/dorm and Encouraged to review menus closely, even look up calories before ordering.  Pt will work on the changes while away at school and f/u as needed.     Patient seen by Medical Provider in past 6 months:  no  Requested to schedule appointment with Medical Provider: No    Anthropometric Measurements  Start Weight (#): 208.6#  Current Weight (#): 207.2#  TBW % Change from start weight: 1%  Ideal Body Weight (#):130#  Goal Weight (#):<160#    Weight Loss History  Previous weight loss attempts: Commercial Programs (Weight Watchers, Julianne Ewing, etc.)  Exercise  Self Created Diets (Portion Control, Healthy Food Choices, etc.)    Food and Nutrition Related History  At home from college right now  Wake up: 10:30-11am now at home, at school up at 6am   Bed Time:varies    Food Recall  Not up until 1pm  Breakfast:not a breakfast person    Snack:-  Lunch:1st meal of the day ~1pm-most often turkey sandwich on carb smart bread (turkey,  "lettuce, tomato, onion) + serving of golf fish  Snack:-  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  If there is late night eat will be [employer] or [employer]--Quesidalls salad or Burrito bowl.  Dinner:  If has dinner at home will be more chicken often--meat, starch (small portion), veggies OR will go out to eat more lately ([employer]inery or Kazakh),  When at school will be more of the take out/eating out.  If eating out will be [employer] or [employer] (more mexican style choices)  Snack:popcorn or chips or cheese its or goldfish or sunchips     Beverages: water, diet soda, and alcohol Volume of beverage intake:   Volume:  100+ oz water per day, alcohol:  winery when home from school (maybe once every 2 weeks now), but at school maybe 1x per month because busy with school/work/studying    Weekends: Worse, more out to eat  Cravings: salty  Trouble area of day:night time    Frequency of Eating out: weekends more often  Food restrictions: some lactose intolerance, no shrimp  Cooking: self and mom, when at school does more ordering out.   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 Thinks this next semester will be more \"regular\" schedule.  Very little exercise at this time.  Frequency: none  Physical limitations/barriers to exercise: schedule    Estimated Needs  Energy  Mack Guzman Energy Needs:   BMR : 1720    1-2# loss weekly sedentary:  3159-6531              1-2# loss weekly lightly active:4031-5906  Maintenance calories for sedentary activity level: 2064  Protein:  71-86g      (1.2-1.5g/kg IBW)  Fluid: 0077-1696     (35mL/kg IBW)    Nutrition Diagnosis  Yes;    Overweight/obesity  related to Excess energy intake as " evidenced by  BMI more than normative standard for age and sex (obesity-grade I 30-34.9)       Nutrition Intervention    Nutrition Prescription  Calories:9077-0178  Protein:70-80g  Fluid:2000ml    Meal Plan (Kelby/Pro/Carb)  Breakfast:  300, 30gm  Snack: ---  Lunch:  275, 21gm  Snack: optional 150cal  Dinner:450, 21gm  Snack:   200 kelby, 0-5gm    Nutrition Education:    Healthy Core Manual  Calorie controlled menu  Lean protein food choices  Healthy snack options  Food journaling tips      Nutrition Counseling:  Strategies: meal planning, portion sizes, healthy snack choices, hydration, fiber intake, protein intake, exercise, food journal      Monitoring and Evaluation:  Evaluation criteria:  Energy Intake  Meet protein needs  Maintain adequate hydration  Monitor weekly weight  Meal planning/preparation  Food journal   Decreased portions at mealtimes and snacks  Physical activity     Barriers to learning:none  Readiness to change: Preparation:  (Getting ready to change)   Comprehension: good  Expected Compliance: good

## 2024-07-17 VITALS — HEIGHT: 66 IN | WEIGHT: 207.4 LBS | BODY MASS INDEX: 33.33 KG/M2

## 2024-08-07 ENCOUNTER — OFFICE VISIT (OUTPATIENT)
Dept: FAMILY MEDICINE CLINIC | Facility: CLINIC | Age: 24
End: 2024-08-07
Payer: COMMERCIAL

## 2024-08-07 VITALS
SYSTOLIC BLOOD PRESSURE: 104 MMHG | DIASTOLIC BLOOD PRESSURE: 70 MMHG | OXYGEN SATURATION: 97 % | RESPIRATION RATE: 16 BRPM | WEIGHT: 207 LBS | TEMPERATURE: 97.2 F | HEIGHT: 66 IN | HEART RATE: 88 BPM | BODY MASS INDEX: 33.27 KG/M2

## 2024-08-07 DIAGNOSIS — G47.00 INSOMNIA, UNSPECIFIED TYPE: ICD-10-CM

## 2024-08-07 DIAGNOSIS — Z13.29 SCREENING FOR THYROID DISORDER: ICD-10-CM

## 2024-08-07 DIAGNOSIS — G43.109 MIGRAINE WITH AURA AND WITHOUT STATUS MIGRAINOSUS, NOT INTRACTABLE: ICD-10-CM

## 2024-08-07 DIAGNOSIS — Z00.00 PHYSICAL EXAM: Primary | ICD-10-CM

## 2024-08-07 DIAGNOSIS — K62.5 RECTAL BLEEDING: ICD-10-CM

## 2024-08-07 DIAGNOSIS — J32.9 SINUSITIS, UNSPECIFIED CHRONICITY, UNSPECIFIED LOCATION: ICD-10-CM

## 2024-08-07 DIAGNOSIS — Z13.220 SCREENING CHOLESTEROL LEVEL: ICD-10-CM

## 2024-08-07 DIAGNOSIS — E78.5 HYPERLIPIDEMIA, UNSPECIFIED HYPERLIPIDEMIA TYPE: ICD-10-CM

## 2024-08-07 DIAGNOSIS — R21 RASH: ICD-10-CM

## 2024-08-07 DIAGNOSIS — E55.9 VITAMIN D DEFICIENCY: ICD-10-CM

## 2024-08-07 PROCEDURE — 99395 PREV VISIT EST AGE 18-39: CPT | Performed by: FAMILY MEDICINE

## 2024-08-07 RX ORDER — CLOTRIMAZOLE AND BETAMETHASONE DIPROPIONATE 10; .64 MG/G; MG/G
CREAM TOPICAL 2 TIMES DAILY
Qty: 45 G | Refills: 2 | Status: SHIPPED | OUTPATIENT
Start: 2024-08-07

## 2024-08-07 NOTE — PROGRESS NOTES
Adult Annual Physical  Name: Brie Randall      : 2000      MRN: [id number]  Encounter Provider: Virginia Hein MD  Encounter Date: 2024   Encounter department: [facility]    Assessment & Plan   1. Physical exam  2. Screening for thyroid disorder  -     TSH, 3rd generation with Free T4 reflex; Future  -     TSH, 3rd generation with Free T4 reflex  3. Hyperlipidemia, unspecified hyperlipidemia type  -     Lipid panel  4. Vitamin D deficiency  -     Vitamin D 25 hydroxy; Future  -     Vitamin D 25 hydroxy  5. Migraine with aura and without status migrainosus, not intractable  6. Sinusitis, unspecified chronicity, unspecified location  7. Insomnia, unspecified type  8. Rectal bleeding  -     CBC and differential; Future  -     Comprehensive metabolic panel; Future  -     CBC and differential  -     Comprehensive metabolic panel  9. Rash  -     clotrimazole-betamethasone (LOTRISONE) 1-0.05 % cream; Apply topically 2 (two) times a day  10. Screening cholesterol level  -     Lipid panel; Future  Physical exam  Rash on bilateral groin seems more of a skin irritation recommend  cream that I prescribed above twice a day as needed  Rectal bleeding while in school likely under stress I recommend her seeing a gastroenterologist she prefers to wait until she is comes back for winter break  Insomnia improving/migraines improving/no acute sinusitis symptoms.  Recommend continuing antiallergy medication daily       History of Present Illness     Adult Annual Physical:  Patient presents for annual physical. 2 rashes  #1 Was on vacation-> was in the hot tub  #2 along the bikini for a year, itchy.    Migraines are better give the summer and stressed.  Overall patient states she is doing very well.  She does want to make note that she did have rectal bleeding sometime when she was in college but it has not happened while she is at home.  Mom prefers to see the gastroenterologist when she  "gets back and does not want a referral at this time.  Overall the patient does extremely well when she is home it is more with her stress at school's when she has some concerns  .     Diet and Physical Activity:  - Diet/Nutrition: well balanced diet.  - Exercise: walking and no formal exercise.    Depression Screening:  - PHQ-2 Score: 0    General Health:  - Sleep: sleeps well.  - Hearing: normal hearing left ear and normal hearing right ear.  - Vision: no vision problems.  - Dental: regular dental visits.    Review of Systems   Constitutional:  Negative for activity change, appetite change, chills, fatigue and fever.   HENT:  Negative for congestion.    Respiratory:  Negative for cough, chest tightness and shortness of breath.    Cardiovascular:  Negative for chest pain and leg swelling.   Gastrointestinal:  Negative for abdominal distention, abdominal pain, constipation, diarrhea, nausea and vomiting.   All other systems reviewed and are negative.        Objective     /70 (BP Location: Left arm, Patient Position: Sitting, Cuff Size: Standard)   Pulse 88   Temp (!) 97.2 °F (36.2 °C)   Resp 16   Ht 5' 6\" (1.676 m)   Wt 93.9 kg (207 lb)   LMP 08/02/2024   SpO2 97%   BMI 33.41 kg/m²     Physical Exam  Vitals reviewed.   Constitutional:       Appearance: Normal appearance. She is well-developed.   HENT:      Head: Normocephalic and atraumatic.      Right Ear: Tympanic membrane, ear canal and external ear normal. There is no impacted cerumen.      Left Ear: Tympanic membrane, ear canal and external ear normal. There is no impacted cerumen.      Nose: Nose normal.      Mouth/Throat:      Mouth: Mucous membranes are moist.      Pharynx: Oropharynx is clear.   Eyes:      Conjunctiva/sclera: Conjunctivae normal.      Pupils: Pupils are equal, round, and reactive to light.   Cardiovascular:      Rate and Rhythm: Normal rate and regular rhythm.      Heart sounds: Normal heart sounds.   Pulmonary:      Effort: " Pulmonary effort is normal.      Breath sounds: Normal breath sounds.   Abdominal:      General: Abdomen is flat. Bowel sounds are normal.      Palpations: Abdomen is soft.   Musculoskeletal:         General: Normal range of motion.      Cervical back: Normal range of motion and neck supple.   Skin:     General: Skin is warm.      Capillary Refill: Capillary refill takes less than 2 seconds.   Neurological:      General: No focal deficit present.      Mental Status: She is alert and oriented to person, place, and time. Mental status is at baseline.   Psychiatric:         Mood and Affect: Mood normal.         Behavior: Behavior normal.         Thought Content: Thought content normal.         Judgment: Judgment normal.

## 2024-08-10 LAB
25(OH)D3+25(OH)D2 SERPL-MCNC: 23 NG/ML (ref 30–100)
ALBUMIN SERPL-MCNC: 4.3 G/DL (ref 4–5)
ALP SERPL-CCNC: 85 IU/L (ref 44–121)
ALT SERPL-CCNC: 20 IU/L (ref 0–32)
AST SERPL-CCNC: 16 IU/L (ref 0–40)
BASOPHILS # BLD AUTO: 0 X10E3/UL (ref 0–0.2)
BASOPHILS NFR BLD AUTO: 0 %
BILIRUB SERPL-MCNC: 0.5 MG/DL (ref 0–1.2)
BUN SERPL-MCNC: 12 MG/DL (ref 6–20)
BUN/CREAT SERPL: 13 (ref 9–23)
CALCIUM SERPL-MCNC: 9.7 MG/DL (ref 8.7–10.2)
CHLORIDE SERPL-SCNC: 102 MMOL/L (ref 96–106)
CHOLEST SERPL-MCNC: 227 MG/DL (ref 100–199)
CHOLEST/HDLC SERPL: 3.3 RATIO (ref 0–4.4)
CO2 SERPL-SCNC: 23 MMOL/L (ref 20–29)
CREAT SERPL-MCNC: 0.89 MG/DL (ref 0.57–1)
EGFR: 94 ML/MIN/1.73
EOSINOPHIL # BLD AUTO: 0.2 X10E3/UL (ref 0–0.4)
EOSINOPHIL NFR BLD AUTO: 3 %
ERYTHROCYTE [DISTWIDTH] IN BLOOD BY AUTOMATED COUNT: 12.1 % (ref 11.7–15.4)
GLOBULIN SER-MCNC: 2.5 G/DL (ref 1.5–4.5)
GLUCOSE SERPL-MCNC: 92 MG/DL (ref 70–99)
HCT VFR BLD AUTO: 42 % (ref 34–46.6)
HDLC SERPL-MCNC: 68 MG/DL
HGB BLD-MCNC: 13.9 G/DL (ref 11.1–15.9)
IMM GRANULOCYTES # BLD: 0 X10E3/UL (ref 0–0.1)
IMM GRANULOCYTES NFR BLD: 0 %
LDLC SERPL CALC-MCNC: 135 MG/DL (ref 0–99)
LYMPHOCYTES # BLD AUTO: 2.4 X10E3/UL (ref 0.7–3.1)
LYMPHOCYTES NFR BLD AUTO: 32 %
MCH RBC QN AUTO: 31.2 PG (ref 26.6–33)
MCHC RBC AUTO-ENTMCNC: 33.1 G/DL (ref 31.5–35.7)
MCV RBC AUTO: 94 FL (ref 79–97)
MONOCYTES # BLD AUTO: 0.6 X10E3/UL (ref 0.1–0.9)
MONOCYTES NFR BLD AUTO: 7 %
NEUTROPHILS # BLD AUTO: 4.5 X10E3/UL (ref 1.4–7)
NEUTROPHILS NFR BLD AUTO: 58 %
PLATELET # BLD AUTO: 288 X10E3/UL (ref 150–450)
POTASSIUM SERPL-SCNC: 4.5 MMOL/L (ref 3.5–5.2)
PROT SERPL-MCNC: 6.8 G/DL (ref 6–8.5)
RBC # BLD AUTO: 4.45 X10E6/UL (ref 3.77–5.28)
SL AMB VLDL CHOLESTEROL CALC: 24 MG/DL (ref 5–40)
SODIUM SERPL-SCNC: 139 MMOL/L (ref 134–144)
TRIGL SERPL-MCNC: 135 MG/DL (ref 0–149)
TSH SERPL DL<=0.005 MIU/L-ACNC: 1.42 UIU/ML (ref 0.45–4.5)
WBC # BLD AUTO: 7.7 X10E3/UL (ref 3.4–10.8)

## 2024-08-14 ENCOUNTER — TELEPHONE (OUTPATIENT)
Dept: FAMILY MEDICINE CLINIC | Facility: CLINIC | Age: 24
End: 2024-08-14

## 2024-08-14 NOTE — TELEPHONE ENCOUNTER
----- Message from Virginia Nagel MD sent at 8/14/2024  1:40 PM EDT -----  Please advise the patient that I reviewed her blood work   cholesterol slightly worsened however it is summertime.  So I recommend watching her carbohydrates, sugars.  And we can repeated in 1 year  Her vitamin D is not as bad but it still remains low I would like her to taking vitamin D3 5000 daily  All other labs are normal

## 2024-11-06 ENCOUNTER — OFFICE VISIT (OUTPATIENT)
Dept: FAMILY MEDICINE CLINIC | Facility: CLINIC | Age: 24
End: 2024-11-06
Payer: COMMERCIAL

## 2024-11-06 VITALS
TEMPERATURE: 96.6 F | BODY MASS INDEX: 32.47 KG/M2 | OXYGEN SATURATION: 99 % | WEIGHT: 202 LBS | DIASTOLIC BLOOD PRESSURE: 84 MMHG | HEIGHT: 66 IN | RESPIRATION RATE: 14 BRPM | SYSTOLIC BLOOD PRESSURE: 110 MMHG | HEART RATE: 84 BPM

## 2024-11-06 DIAGNOSIS — K59.09 OTHER CONSTIPATION: Primary | ICD-10-CM

## 2024-11-06 DIAGNOSIS — K62.5 RECTAL BLEEDING: ICD-10-CM

## 2024-11-06 PROCEDURE — 99213 OFFICE O/P EST LOW 20 MIN: CPT | Performed by: FAMILY MEDICINE

## 2024-11-06 RX ORDER — HYDROCORTISONE ACETATE 25 MG/1
25 SUPPOSITORY RECTAL 2 TIMES DAILY
Qty: 12 SUPPOSITORY | Refills: 0 | Status: SHIPPED | OUTPATIENT
Start: 2024-11-06

## 2024-11-06 RX ORDER — HYDROCORTISONE ACETATE 25 MG/1
25 SUPPOSITORY RECTAL 2 TIMES DAILY
Qty: 12 SUPPOSITORY | Refills: 0 | Status: SHIPPED | OUTPATIENT
Start: 2024-11-06 | End: 2024-11-06 | Stop reason: SDUPTHER

## 2024-11-06 RX ORDER — HYDROCORTISONE 25 MG/G
CREAM TOPICAL 2 TIMES DAILY
Qty: 28 G | Refills: 0 | Status: SHIPPED | OUTPATIENT
Start: 2024-11-06 | End: 2024-11-06 | Stop reason: SDUPTHER

## 2024-11-06 RX ORDER — HYDROCORTISONE 25 MG/G
CREAM TOPICAL 2 TIMES DAILY
Qty: 28 G | Refills: 0 | Status: SHIPPED | OUTPATIENT
Start: 2024-11-06

## 2024-11-06 NOTE — PROGRESS NOTES
Brie Randall 2000 female MRN: [id number]    [facility] OFFICE VISIT  [facility] - [facility]      ASSESSMENT/PLAN  Brie Randall is a 24 y.o. female presents to the office for    Diagnoses and all orders for this visit:    Other constipation    Rectal bleeding  -     hydrocortisone (ANUSOL-HC) 25 mg suppository; Insert 1 suppository (25 mg total) into the rectum 2 (two) times a day  -     hydrocortisone (ANUSOL-HC) 2.5 % rectal cream; Apply topically 2 (two) times a day       Recommend that we do a cleanse to be sure patient's constipation is fully resolved and then restart by taking fiber supplements.  Referred to gastroenterology  Mirlax x 3 days, 1 ducolax in AM of Thursday  Start Fiber OTC  Sippository x 3 days then stop           Future Appointments   Date Time Provider Department Center   11/8/2024 10:00 AM [facility]          SUBJECTIVE  CC: Constipation (Patient says she is moving her bowel but it hurt  and she is straining to go . She sees blood in her bowels)      HPI:  Brie Randall is a 24 y.o. female who presents for an acute appointment.  Patient states she has been having bowel movements  that been very painful.  She states that she has also been having blood in her stools and in the bowl.  States that it has been concerning her and mom made an appointment with a gastroenterologist just to be safe.  Review of Systems   Constitutional:  Negative for activity change, appetite change, chills, fatigue and fever.   HENT:  Negative for congestion.    Respiratory:  Negative for cough, chest tightness and shortness of breath.    Cardiovascular:  Negative for chest pain and leg swelling.   Gastrointestinal:  Positive for blood in stool and constipation. Negative for abdominal distention, abdominal pain, diarrhea, nausea and vomiting.   All other systems reviewed and are negative.      Historical Information   The  "patient history was reviewed as follows:  Past Medical History:   Diagnosis Date    Headache(784.0) 2009    Migraine          Medications:     Current Outpatient Medications:     clotrimazole-betamethasone (LOTRISONE) 1-0.05 % cream, Apply topically 2 (two) times a day, Disp: 45 g, Rfl: 2    hydrocortisone (ANUSOL-HC) 2.5 % rectal cream, Apply topically 2 (two) times a day, Disp: 28 g, Rfl: 0    hydrocortisone (ANUSOL-HC) 25 mg suppository, Insert 1 suppository (25 mg total) into the rectum 2 (two) times a day, Disp: 12 suppository, Rfl: 0    norethindrone (Gayle) 0.35 MG tablet, Take 1 tablet (0.35 mg total) by mouth daily Do not start before January 1, 2024., Disp: 84 tablet, Rfl: 3    ondansetron (ZOFRAN) 8 mg tablet, Take 1 tablet (8 mg total) by mouth every 12 (twelve) hours as needed for nausea or vomiting, Disp: 10 tablet, Rfl: 0    Triamcinolone Acetonide (Nasacort Allergy) 55 MCG/ACT nasal spray, by Each Nare route daily, Disp: , Rfl:     No Known Allergies    OBJECTIVE  Vitals:   Vitals:    11/06/24 1331   BP: 110/84   BP Location: Right arm   Patient Position: Sitting   Cuff Size: Standard   Pulse: 84   Resp: 14   Temp: (!) 96.6 °F (35.9 °C)   SpO2: 99%   Weight: 91.6 kg (202 lb)   Height: 5' 6\" (1.676 m)         Physical Exam  Vitals reviewed.   Constitutional:       Appearance: She is well-developed.   HENT:      Head: Normocephalic and atraumatic.   Eyes:      Conjunctiva/sclera: Conjunctivae normal.      Pupils: Pupils are equal, round, and reactive to light.   Cardiovascular:      Rate and Rhythm: Normal rate and regular rhythm.      Heart sounds: Normal heart sounds, S1 normal and S2 normal. No murmur heard.  Pulmonary:      Effort: Pulmonary effort is normal. No respiratory distress.      Breath sounds: Normal breath sounds. No wheezing.   Musculoskeletal:         General: Normal range of motion.      Cervical back: Normal range of motion and neck supple.   Skin:     General: Skin is warm. "   Neurological:      Mental Status: She is alert and oriented to person, place, and time.   Psychiatric:         Speech: Speech normal.         Behavior: Behavior normal.         Thought Content: Thought content normal.         Judgment: Judgment normal.                    Virginia Nagel MD,   [facility]  11/6/2024

## 2024-11-08 ENCOUNTER — OFFICE VISIT (OUTPATIENT)
Dept: GASTROENTEROLOGY | Facility: CLINIC | Age: 24
End: 2024-11-08
Payer: COMMERCIAL

## 2024-11-08 ENCOUNTER — TELEPHONE (OUTPATIENT)
Dept: GASTROENTEROLOGY | Facility: CLINIC | Age: 24
End: 2024-11-08

## 2024-11-08 VITALS
HEART RATE: 77 BPM | DIASTOLIC BLOOD PRESSURE: 70 MMHG | BODY MASS INDEX: 32.47 KG/M2 | WEIGHT: 202 LBS | SYSTOLIC BLOOD PRESSURE: 94 MMHG | HEIGHT: 66 IN

## 2024-11-08 DIAGNOSIS — K62.5 RECTAL BLEEDING: Primary | ICD-10-CM

## 2024-11-08 DIAGNOSIS — K59.00 CONSTIPATION, UNSPECIFIED CONSTIPATION TYPE: ICD-10-CM

## 2024-11-08 DIAGNOSIS — K58.9 IRRITABLE BOWEL SYNDROME WITHOUT DIARRHEA: ICD-10-CM

## 2024-11-08 PROCEDURE — 99204 OFFICE O/P NEW MOD 45 MIN: CPT | Performed by: NURSE PRACTITIONER

## 2024-11-08 RX ORDER — POLYETHYLENE GLYCOL 3350 17 G/17G
17 POWDER, FOR SOLUTION ORAL
COMMUNITY

## 2024-11-08 RX ORDER — POLYETHYLENE GLYCOL 3350, SODIUM CHLORIDE, SODIUM BICARBONATE, POTASSIUM CHLORIDE 420; 11.2; 5.72; 1.48 G/4L; G/4L; G/4L; G/4L
4000 POWDER, FOR SOLUTION ORAL ONCE
Qty: 4000 ML | Refills: 0 | Status: SHIPPED | OUTPATIENT
Start: 2024-11-08 | End: 2024-11-08

## 2024-11-08 RX ORDER — BISACODYL 5 MG/1
5 TABLET, DELAYED RELEASE ORAL DAILY PRN
COMMUNITY

## 2024-11-08 NOTE — PROGRESS NOTES
[facility] [place] - Outpatient Consultation  Brie Randall 24 y.o. female MRN: [id number]  Encounter: [id number]          ASSESSMENT AND PLAN:      1. Constipation, unspecified constipation type  2. Rectal bleeding  Constipation likely secondary to diet as symptoms improve with increasing dietary fiber. Intermittent rectal bleeding most likely hemorrhoidal in the setting of constipation/straining, r/o anorectal lesion. She denies any rectal pain, abdominal pain, nausea, bloating, unintended weight loss. Recent CBC,CMP, TSH normal. There is no family hx of colon cancer.  -Optimize fiber intake through diet or take a daily fiber supplement  -Stay well hydrated, exercise regularly   -Continue Miralax daily and titrate to effect. Can likely discontinue once diet has been optimized, however did discuss this is safe to take life long. Dulcolax can be used for rescue therapy. She currently is not having any rectal pain/bleeding, but can use prescribed Anusol if she has recurrent symptoms for empiric treatment of hemorrhoids.   -Colonoscopy scheduled.  Will schedule with Dr. Maldonado as her mother sees him as well & use GoLytely in split dosing. Prep and procedure explained.  -     Colonoscopy; Future; Expected date: 11/08/2024  -     polyethylene glycol-electrolytes (TriLyte) 4000 mL solution; Take 4,000 mL by mouth once for 1 dose Take 4000 mL by mouth once for 1 dose. Use as directed          ______________________________________________________________________    HPI:  Brie Randall is a 24 y.o. female who presents with her mother for evaluation of constipation and rectal bleeding. Symptoms have been waxing and waning for 2 years, typically worse when she's away at college, better when she's at home. Mother thinks it's likely diet related due to lack of fiber as pt eats a lot of [employer] sandwiches/[employer] while at school. Patient reports she's moving her bowels daily with small amounts  of hard stool and straining with intermittent rectal bleeding. She saw her PCP a couple days ago who recommended taking dulcolax and starting Miralax as well as a daily fiber supplement. Her last BM was last night. She denies any abdominal pain, nausea, bloating, unintended weight loss. No family hx of colon cancer. Recent labs in August with normal CBC, CMP, TSH. 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        REVIEW OF SYSTEMS:    CONSTITUTIONAL: Denies any fever, chills, rigors, and weight loss.  HEENT: No earache or tinnitus.  CARDIOVASCULAR: No chest pain or palpitations.   RESPIRATORY: Denies any cough, hemoptysis, shortness of breath or dyspnea on exertion.  GASTROINTESTINAL: As noted in the History of Present Illness.   GENITOURINARY: Denies any hematuria or dysuria.  NEUROLOGIC: No dizziness or vertigo.   MUSCULOSKELETAL: Denies any joint swellings.  SKIN: Denies skin rashes or itching.   ENDOCRINE: Denies excessive thirst. Denies intolerance to heat or cold.  PSYCHOSOCIAL: Denies depression or anxiety. Denies any recent memory loss.       Historical Information   Past Medical History:   Diagnosis Date    Headache(784.0) 2009    Migraine      History reviewed. No pertinent surgical history.  Social History   Social History     Substance and Sexual Activity   Alcohol Use Not Currently    Comment: occasionally     Social History     Substance and Sexual Activity   Drug Use Never     Social History     Tobacco Use   Smoking Status Never   Smokeless Tobacco Never     Family History   Problem Relation Age of Onset    Melanoma Mother     Arthritis Mother     Cancer Mother     Asthma Father     Allergies Father         seasonal    Arthritis Father     Endometriosis Maternal Aunt     Endometriosis Paternal Aunt     Lung cancer Maternal Grandmother     Uterine cancer Maternal Grandmother     Stroke Maternal Grandmother     Diabetes Maternal Aunt        Meds/Allergies       Current  "Outpatient Medications:     bisacodyl 5 mg EC tablet    clotrimazole-betamethasone (LOTRISONE) 1-0.05 % cream    hydrocortisone (ANUSOL-HC) 2.5 % rectal cream    hydrocortisone (ANUSOL-HC) 25 mg suppository    norethindrone (Gayle) 0.35 MG tablet    ondansetron (ZOFRAN) 8 mg tablet    polyethylene glycol (MIRALAX) 17 g packet    polyethylene glycol-electrolytes (TriLyte) 4000 mL solution    Triamcinolone Acetonide (Nasacort Allergy) 55 MCG/ACT nasal spray    No Known Allergies        Objective     Blood pressure 94/70, pulse 77, height 5' 6\" (1.676 m), weight 91.6 kg (202 lb). Body mass index is 32.6 kg/m².        PHYSICAL EXAM:      General Appearance:   Alert, cooperative, no distress   HEENT:   Normocephalic, atraumatic, anicteric.     Neck:  Supple, symmetrical, trachea midline   Lungs:   Clear to auscultation bilaterally; no rales, rhonchi or wheezing; respirations unlabored    Heart::   Regular rate and rhythm; no murmur.   Abdomen:   Soft, non-tender, non-distended; normal bowel sounds; no masses, no organomegaly    Genitalia:   Deferred    Rectal:   Deferred    Extremities:  No cyanosis, clubbing or edema    Skin:  No jaundice, rashes, or lesions    Lymph nodes:  No palpable cervical lymphadenopathy        Lab Results:   No visits with results within 1 Day(s) from this visit.   Latest known visit with results is:   Office Visit on 08/07/2024   Component Date Value    White Blood Cell Count 08/09/2024 7.7     Red Blood Cell Count 08/09/2024 4.45     Hemoglobin 08/09/2024 13.9     HCT 08/09/2024 42.0     MCV 08/09/2024 94     MCH 08/09/2024 31.2     MCHC 08/09/2024 33.1     RDW 08/09/2024 12.1     Platelet Count 08/09/2024 288     Neutrophils 08/09/2024 58     Lymphocytes 08/09/2024 32     Monocytes 08/09/2024 7     Eosinophils 08/09/2024 3     Basophils PCT 08/09/2024 0     Neutrophils (Absolute) 08/09/2024 4.5     Lymphocytes (Absolute) 08/09/2024 2.4     Monocytes (Absolute) 08/09/2024 0.6     Eosinophils " (Absolute) 08/09/2024 0.2     Basophils ABS 08/09/2024 0.0     Immature Granulocytes 08/09/2024 0     Immature Granulocytes (A* 08/09/2024 0.0     Glucose, Random 08/09/2024 92     BUN 08/09/2024 12     Creatinine 08/09/2024 0.89     eGFR 08/09/2024 94     SL AMB BUN/CREATININE RA* 08/09/2024 13     Sodium 08/09/2024 139     Potassium 08/09/2024 4.5     Chloride 08/09/2024 102     CO2 08/09/2024 23     CALCIUM 08/09/2024 9.7     Protein, Total 08/09/2024 6.8     Albumin 08/09/2024 4.3     Globulin, Total 08/09/2024 2.5     TOTAL BILIRUBIN 08/09/2024 0.5     Alk Phos Isoenzymes 08/09/2024 85     AST 08/09/2024 16     ALT 08/09/2024 20     TSH 08/09/2024 1.420     Cholesterol, Total 08/09/2024 227 (H)     Triglycerides 08/09/2024 135     HDL 08/09/2024 68     VLDL Cholesterol Calcula* 08/09/2024 24     LDL Calculated 08/09/2024 135 (H)     T. Chol/HDL Ratio 08/09/2024 3.3     25-HYDROXY VIT D 08/09/2024 23.0 (L)          Radiology Results:   No results found.

## 2024-11-08 NOTE — PATIENT INSTRUCTIONS
"You have been prescribed miralax (polyethylene glycol) for treatment of your CONSTIPATION.    Start 1 capful daily. Take this dose x 3 days. If your symptoms are improved, great! Continue this dose daily.    If you have diarrhea (stools are loose, associated with accidents, or urgency) with this dose, cut down to 1/2 capful daily. Continue to titrate down until you find the dose for you. This might be 1 tbsp daily. Wait 3 days before making a change.    If you have continued constipation with 1 capful daily, you may need a higher dose. Start with 1.5 capfuls daily and titrate upward. Eg might need 1 capful twice daily. There is no maximum dose, whatever works for you. Again, wait 3 days before making a change.    Patient Education     High-fiber diet   The Basics   Written by the doctors and editors at [facility]   What is fiber? -- Fiber is a substance found in some fruits, vegetables, and grains. Most fiber passes through your body without being digested. But it can affect how you digest other foods, and it can also improve your bowel movements.  There are 2 kinds of fiber. One kind is called \"soluble fiber\" and is found in fruits, oats, barley, beans, and peas. The other kind is called \"insoluble fiber,\" and is found in wheat, rye, and other grains.  Both kinds of fiber that you eat are called \"dietary fiber.\"  Why is fiber important to my health? -- Fiber can help make your bowel movements softer and more regular. Adding fiber to your diet can help with problems including constipation, hemorrhoids, and diarrhea. Plus, it can help prevent \"accidents\" if you have trouble controlling your bowel movements.  Getting enough fiber can also help lower your risk of heart disease, stroke, and type 2 diabetes. That's because fiber can help lower cholesterol and help control blood sugar.  How much fiber do I need? -- The recommended amount of fiber is 20 to 35 grams a day. The nutrition label on packaged foods can show you " "how much fiber you are getting in each serving (figure 1).  How can I make sure I'm getting enough fiber? -- To make sure that you're getting enough fiber, eat plenty of the fruits, vegetables, and grains that contain fiber (table 1 and figure 2). Many breakfast cereals also have a lot of fiber.  If you can't get enough fiber from food, you can add wheat bran to the foods you do eat. Or you can take fiber supplements. These come in the form of powders, wafers, or pills. They include psyllium seed (sample brand names: Metamucil, Konsyl), methylcellulose (sample brand name: Citrucel), polycarbophil (sample brand name: FiberCon), and wheat dextrin (sample brand name: Benefiber). If you take a fiber supplement, be sure to read the label so you know how much to take. If you're not sure, ask your doctor or nurse.  What are the side effects of fiber? -- When you start eating more fiber, your belly might feel bloated, or you might have gas or cramps. You can avoid these side effects by adding fiber to your diet slowly.  Some people feel worse when they eat more fiber or take fiber supplements. If you feel worse after adding more fiber to your diet, you can try decreasing the amount of fiber to see if that helps.  All topics are updated as new evidence becomes available and our peer review process is complete.  This topic retrieved from [employer] on: Feb 28, 2024.  Topic 91276 Version 10.0  Release: 32.2.4 - C32.58  © 2024 UpToDate, Inc. and/or its affiliates. All rights reserved.  figure 1: Nutrition label - Fiber     This is an example of a nutrition label. To figure out how much fiber is in a food, look for the line that says \"Dietary Fiber.\" It's also important to look at the serving size. This food has 7 grams of fiber in each serving, and each serving is 1 cup.  Graphic 37864 Version 8.0  table 1: Amount of fiber in different foods  Food  Serving  Grams of fiber    Fruits    Apple (with skin) 1 medium apple 4.4   Banana " 1 medium banana 3.1   Oranges 1 orange 3.1   Prunes 1 cup, pitted 12.4   Juices    Apple, unsweetened, with added ascorbic acid 1 cup 0.5   Grapefruit, white, canned, sweetened 1 cup 0.2   Grape, unsweetened, with added ascorbic acid 1 cup 0.5   Orange 1 cup 0.7   Vegetables    Cooked   Green beans 1 cup 4.0   Carrots 1/2 cup sliced 2.3   Peas 1 cup 8.8   Potato (baked, with skin) 1 medium potato 3.8   Raw   Cucumber (with peel) 1 cucumber 1.5   Lettuce 1 cup shredded 0.5   Tomato 1 medium tomato 1.5   Spinach 1 cup 0.7   Legumes   Baked beans, canned, no salt added 1 cup 13.9   Kidney beans, canned 1 cup 13.6   Lima beans, canned 1 cup 11.6   Lentils, boiled 1 cup 15.6   Breads, pastas, flours    Bran muffins 1 medium muffin 5.2   Oatmeal, cooked 1 cup 4.0   White bread 1 slice 0.6   Whole-wheat bread 1 slice 1.9   Pasta and rice, cooked   Macaroni 1 cup 2.5   Rice, brown 1 cup 3.5   Rice, white 1 cup 0.6   Spaghetti (regular) 1 cup 2.5   Nuts    Almonds 1/2 cup 8.7   Peanuts 1/2 cup 7.9   To learn how much fiber and other nutrients are in different foods, visit the [place] Department of Agriculture (USDA) FoodData Central website.  Graphic 83222 Version 6.0  figure 2: Foods with fiber     Foods with a lot of fiber include prunes, apples, oranges, bananas, peas, green beans, kidney beans, cooked oatmeal, almonds, peanuts, and whole-wheat bread.  Graphic 02934 Version 1.0  Consumer Information Use and Disclaimer   Disclaimer: This generalized information is a limited summary of diagnosis, treatment, and/or medication information. It is not meant to be comprehensive and should be used as a tool to help the user understand and/or assess potential diagnostic and treatment options. It does NOT include all information about conditions, treatments, medications, side effects, or risks that may apply to a specific patient. It is not intended to be medical advice or a substitute for the medical advice, diagnosis, or  treatment of a health care provider based on the health care provider's examination and assessment of a patient's specific and unique circumstances. Patients must speak with a health care provider for complete information about their health, medical questions, and treatment options, including any risks or benefits regarding use of medications. This information does not endorse any treatments or medications as safe, effective, or approved for treating a specific patient. UpToDate, Inc. and its affiliates disclaim any warranty or liability relating to this information or the use thereof.The use of this information is governed by the Terms of Use, available at https://www.[employer].com/en/know/clinical-effectiveness-terms. 2024© UpToDate, Inc. and its affiliates and/or licensors. All rights reserved.  Copyright   © 2024 UpToDate, Inc. and/or its affiliates. All rights reserved.

## 2024-11-08 NOTE — TELEPHONE ENCOUNTER
Scheduled date of colonoscopy (as of today): Dec. 27, 2024  Physician performing colonoscopy: Dr. Maldonado  Location of colonoscopy: [facility]  Bowel prep reviewed with patient: Golytely  Instructions reviewed with patient by: YOANA  Clearances: NA

## 2024-12-23 ENCOUNTER — TELEPHONE (OUTPATIENT)
Dept: GASTROENTEROLOGY | Facility: CLINIC | Age: 24
End: 2024-12-23

## 2024-12-23 NOTE — TELEPHONE ENCOUNTER
Spoke with pt confirming procedure for 12/27 with Dr. Maldonado. She has her prep and her  arranged. She will be called day prior with her arrival time.

## 2024-12-26 ENCOUNTER — TELEPHONE (OUTPATIENT)
Dept: GASTROENTEROLOGY | Facility: CLINIC | Age: 24
End: 2024-12-26

## 2025-01-03 ENCOUNTER — OFFICE VISIT (OUTPATIENT)
Dept: FAMILY MEDICINE CLINIC | Facility: CLINIC | Age: 25
End: 2025-01-03
Payer: COMMERCIAL

## 2025-01-03 VITALS
DIASTOLIC BLOOD PRESSURE: 70 MMHG | HEIGHT: 66 IN | BODY MASS INDEX: 32.95 KG/M2 | SYSTOLIC BLOOD PRESSURE: 106 MMHG | OXYGEN SATURATION: 99 % | HEART RATE: 94 BPM | WEIGHT: 205 LBS | TEMPERATURE: 98.1 F | RESPIRATION RATE: 16 BRPM

## 2025-01-03 DIAGNOSIS — J32.9 SINUSITIS, UNSPECIFIED CHRONICITY, UNSPECIFIED LOCATION: Primary | ICD-10-CM

## 2025-01-03 PROCEDURE — 99213 OFFICE O/P EST LOW 20 MIN: CPT | Performed by: FAMILY MEDICINE

## 2025-01-03 RX ORDER — METHYLPREDNISOLONE 4 MG/1
TABLET ORAL
Qty: 21 EACH | Refills: 0 | Status: SHIPPED | OUTPATIENT
Start: 2025-01-03

## 2025-01-03 NOTE — PROGRESS NOTES
Feel bad for her Brie Randall 2000 female MRN: [id number]    FAMILY PRACTICE OFFICE VISIT  [facility] - [facility]      ASSESSMENT/PLAN  Brie Randall is a 24 y.o. female presents to the office for    1. Sinusitis, unspecified chronicity, unspecified location (Primary)    Acute sinusitis started on treatment shown below.  If no improvement recommend contacting our office  - methylPREDNISolone 4 MG tablet therapy pack; Use as directed on package  Dispense: 21 each; Refill: 0  - amoxicillin-clavulanate (AUGMENTIN) 875-125 mg per tablet; Take 1 tablet by mouth every 12 (twelve) hours for 7 days  Dispense: 14 tablet; Refill: 0            Future Appointments   Date Time Provider Department Center   7/28/2025  7:30 AM Diogenes Maldonado MD [facility]          SUBJECTIVE  CC: Sinus Problem (Sinus presurre, headache comes and goes , for the pasted 2 weeks)      HPI:  Brie Randall is a 24 y.o. female who presents for an acute appointment. 2 weeks ago, started with  sinus pressure, headaches comes and goes. Tickle and throat.  States at first she thought it was nothing felt it was likely allergies but then it has been worsening with some pressure in her sinuses headaches that come and go and worsening and feels like she has a sinus infection.  Review of Systems   Constitutional:  Positive for fatigue. Negative for activity change, appetite change, chills and fever.   HENT:  Positive for congestion, sinus pressure and sinus pain.    Respiratory:  Negative for cough, chest tightness and shortness of breath.    Cardiovascular:  Negative for chest pain and leg swelling.   Gastrointestinal:  Negative for abdominal distention, abdominal pain, constipation, diarrhea, nausea and vomiting.   All other systems reviewed and are negative.      Historical Information   The patient history was reviewed as follows:  Past Medical History:   Diagnosis Date   • Headache(687.0) 2009  "  • Migraine          Medications:     Current Outpatient Medications:   •  amoxicillin-clavulanate (AUGMENTIN) 875-125 mg per tablet, Take 1 tablet by mouth every 12 (twelve) hours for 7 days, Disp: 14 tablet, Rfl: 0  •  clotrimazole-betamethasone (LOTRISONE) 1-0.05 % cream, Apply topically 2 (two) times a day, Disp: 45 g, Rfl: 2  •  methylPREDNISolone 4 MG tablet therapy pack, Use as directed on package, Disp: 21 each, Rfl: 0  •  norethindrone (Gayle) 0.35 MG tablet, Take 1 tablet (0.35 mg total) by mouth daily Do not start before January 1, 2024., Disp: 84 tablet, Rfl: 3  •  bisacodyl 5 mg EC tablet, Take 5 mg by mouth daily as needed for constipation (Patient not taking: Reported on 1/3/2025), Disp: , Rfl:   •  hydrocortisone (ANUSOL-HC) 2.5 % rectal cream, Apply topically 2 (two) times a day (Patient not taking: Reported on 1/3/2025), Disp: 28 g, Rfl: 0  •  hydrocortisone (ANUSOL-HC) 25 mg suppository, Insert 1 suppository (25 mg total) into the rectum 2 (two) times a day (Patient not taking: Reported on 1/3/2025), Disp: 12 suppository, Rfl: 0  •  polyethylene glycol (MIRALAX) 17 g packet, 17 g (Patient not taking: Reported on 1/3/2025), Disp: , Rfl:   •  polyethylene glycol-electrolytes (TriLyte) 4000 mL solution, Take 4,000 mL by mouth once for 1 dose Take 4000 mL by mouth once for 1 dose. Use as directed, Disp: 4000 mL, Rfl: 0  •  Triamcinolone Acetonide (Nasacort Allergy) 55 MCG/ACT nasal spray, by Each Nare route daily (Patient not taking: Reported on 1/3/2025), Disp: , Rfl:     No Known Allergies    OBJECTIVE  Vitals:   Vitals:    01/03/25 1450   BP: 106/70   BP Location: Left arm   Patient Position: Sitting   Cuff Size: Large   Pulse: 94   Resp: 16   Temp: 98.1 °F (36.7 °C)   TempSrc: Temporal   SpO2: 99%   Weight: 93 kg (205 lb)   Height: 5' 6\" (1.676 m)         Physical Exam  Vitals reviewed.   Constitutional:       Appearance: She is well-developed.   HENT:      Head: Normocephalic and atraumatic. "      Right Ear: A middle ear effusion is present.      Left Ear: A middle ear effusion is present.      Mouth/Throat:      Pharynx: Posterior oropharyngeal erythema present.   Eyes:      Conjunctiva/sclera: Conjunctivae normal.      Pupils: Pupils are equal, round, and reactive to light.   Cardiovascular:      Rate and Rhythm: Normal rate and regular rhythm.      Heart sounds: Normal heart sounds, S1 normal and S2 normal. No murmur heard.  Pulmonary:      Effort: Pulmonary effort is normal. No respiratory distress.      Breath sounds: Normal breath sounds. No wheezing.   Musculoskeletal:         General: Normal range of motion.      Cervical back: Normal range of motion and neck supple.   Skin:     General: Skin is warm.   Neurological:      Mental Status: She is alert and oriented to person, place, and time.   Psychiatric:         Speech: Speech normal.         Behavior: Behavior normal.         Thought Content: Thought content normal.         Judgment: Judgment normal.                    Virginia Nagel MD,   [facility]  1/5/2025

## 2025-04-04 ENCOUNTER — OFFICE VISIT (OUTPATIENT)
Dept: FAMILY MEDICINE CLINIC | Facility: CLINIC | Age: 25
End: 2025-04-04
Payer: COMMERCIAL

## 2025-04-04 VITALS
OXYGEN SATURATION: 97 % | HEART RATE: 96 BPM | BODY MASS INDEX: 33.49 KG/M2 | DIASTOLIC BLOOD PRESSURE: 76 MMHG | SYSTOLIC BLOOD PRESSURE: 94 MMHG | HEIGHT: 66 IN | WEIGHT: 208.4 LBS | RESPIRATION RATE: 16 BRPM | TEMPERATURE: 97.9 F

## 2025-04-04 DIAGNOSIS — R21 RASH: Primary | ICD-10-CM

## 2025-04-04 PROCEDURE — 99214 OFFICE O/P EST MOD 30 MIN: CPT | Performed by: FAMILY MEDICINE

## 2025-04-04 RX ORDER — TRIAMCINOLONE ACETONIDE 1 MG/G
CREAM TOPICAL 2 TIMES DAILY
Qty: 453.6 G | Refills: 0 | Status: SHIPPED | OUTPATIENT
Start: 2025-04-04

## 2025-04-04 RX ORDER — METHYLPREDNISOLONE 4 MG/1
TABLET ORAL
Qty: 21 EACH | Refills: 0 | Status: SHIPPED | OUTPATIENT
Start: 2025-04-04

## 2025-04-04 RX ORDER — DOXYCYCLINE 100 MG/1
100 CAPSULE ORAL EVERY 12 HOURS SCHEDULED
Qty: 14 CAPSULE | Refills: 0 | Status: SHIPPED | OUTPATIENT
Start: 2025-04-04 | End: 2025-04-11

## 2025-04-04 NOTE — PROGRESS NOTES
Name: Brie Randall      : 2000      MRN: [id number]  Encounter Provider: Virginia Nagel MD  Encounter Date: 2025   Encounter department: [facility]    Assessment & Plan  Rash  Folliculitis recommend started on doxycycline  Recommend taking Allegra a.m.  Recommend taking Pepcid p.m.  /Medrol pack/Kenalog cream as needed for itching.  I do believe that it is following ever she issues with her nails.  Avoid itching at all cost.  Recommend establishing care with dermatology  Orders:  •  doxycycline hyclate (VIBRAMYCIN) 100 mg capsule; Take 1 capsule (100 mg total) by mouth every 12 (twelve) hours for 7 days  •  methylPREDNISolone 4 MG tablet therapy pack; Use as directed on package  •  Ambulatory Referral to Dermatology; Future  •  triamcinolone (KENALOG) 0.1 % cream; Apply topically 2 (two) times a day         History of Present Illness     Rash  Pertinent negatives include no congestion, cough, diarrhea, fatigue, fever, shortness of breath or vomiting.   States that she has had this rash on and off.  States that now it is amplified.  Started in her lower abdomen and now it spread to her lower legs the back of her arms the stomach and multiple areas.  States it is very itchy.  Does not know where she obtained it    Review of Systems   Constitutional:  Negative for activity change, appetite change, chills, fatigue and fever.   HENT:  Negative for congestion.    Respiratory:  Negative for cough, chest tightness and shortness of breath.    Cardiovascular:  Negative for chest pain and leg swelling.   Gastrointestinal:  Negative for abdominal distention, abdominal pain, constipation, diarrhea, nausea and vomiting.   Skin:  Positive for rash.   All other systems reviewed and are negative.    Past Medical History:   Diagnosis Date   • Headache(784.0)    • Migraine      No past surgical history on file.  Family History   Problem Relation Age of Onset   • Melanoma Mother     • Arthritis Mother    • Cancer Mother    • Asthma Father    • Allergies Father         seasonal   • Arthritis Father    • Endometriosis Maternal Aunt    • Endometriosis Paternal Aunt    • Lung cancer Maternal Grandmother    • Uterine cancer Maternal Grandmother    • Stroke Maternal Grandmother    • Diabetes Maternal Aunt      Social History     Tobacco Use   • Smoking status: Never   • Smokeless tobacco: Never   Vaping Use   • Vaping status: Never Used   Substance and Sexual Activity   • Alcohol use: Not Currently     Comment: occasionally   • Drug use: Never   • Sexual activity: Never     Current Outpatient Medications on File Prior to Visit   Medication Sig   • clotrimazole-betamethasone (LOTRISONE) 1-0.05 % cream Apply topically 2 (two) times a day   • norethindrone (Gayle) 0.35 MG tablet Take 1 tablet (0.35 mg total) by mouth daily Do not start before January 1, 2024.   • bisacodyl 5 mg EC tablet Take 5 mg by mouth daily as needed for constipation (Patient not taking: Reported on 1/3/2025)   • hydrocortisone (ANUSOL-HC) 2.5 % rectal cream Apply topically 2 (two) times a day (Patient not taking: Reported on 1/3/2025)   • hydrocortisone (ANUSOL-HC) 25 mg suppository Insert 1 suppository (25 mg total) into the rectum 2 (two) times a day (Patient not taking: Reported on 1/3/2025)   • methylPREDNISolone 4 MG tablet therapy pack Use as directed on package (Patient not taking: Reported on 4/4/2025)   • polyethylene glycol (MIRALAX) 17 g packet 17 g (Patient not taking: Reported on 1/3/2025)   • polyethylene glycol-electrolytes (TriLyte) 4000 mL solution Take 4,000 mL by mouth once for 1 dose Take 4000 mL by mouth once for 1 dose. Use as directed   • Triamcinolone Acetonide (Nasacort Allergy) 55 MCG/ACT nasal spray by Each Nare route daily (Patient not taking: Reported on 1/3/2025)     No Known Allergies  Immunization History   Administered Date(s) Administered   • COVID-19 PFIZER VACCINE 0.3 ML IM 04/17/2021,  "05/08/2021     Objective   BP 94/76 (BP Location: Left arm, Patient Position: Sitting, Cuff Size: Large)   Pulse 96   Temp 97.9 °F (36.6 °C) (Tympanic)   Resp 16   Ht 5' 6\" (1.676 m)   Wt 94.5 kg (208 lb 6.4 oz)   LMP 04/02/2025   SpO2 97%   BMI 33.64 kg/m²     Physical Exam  Constitutional:       Appearance: She is well-developed.   HENT:      Head: Normocephalic and atraumatic.   Pulmonary:      Effort: Pulmonary effort is normal.   Musculoskeletal:         General: Normal range of motion.   Neurological:      Mental Status: She is alert and oriented to person, place, and time.   Psychiatric:         Behavior: Behavior normal.         Thought Content: Thought content normal.         Judgment: Judgment normal.         "

## 2025-06-03 ENCOUNTER — CONSULT (OUTPATIENT)
Age: 25
End: 2025-06-03
Payer: COMMERCIAL

## 2025-06-03 VITALS — HEIGHT: 66 IN | BODY MASS INDEX: 33.75 KG/M2 | WEIGHT: 210 LBS

## 2025-06-03 DIAGNOSIS — L73.9 FOLLICULITIS: ICD-10-CM

## 2025-06-03 DIAGNOSIS — D22.9 MULTIPLE MELANOCYTIC NEVI: ICD-10-CM

## 2025-06-03 DIAGNOSIS — L70.0 ACNE VULGARIS: Primary | ICD-10-CM

## 2025-06-03 PROCEDURE — 99204 OFFICE O/P NEW MOD 45 MIN: CPT | Performed by: DERMATOLOGY

## 2025-06-03 NOTE — PROGRESS NOTES
"[facility] Dermatology Clinic Note     Patient Name: Brie Randall  Encounter Date: 6/3/2025       Have you been cared for by a [facility] Dermatologist in the last 3 years and, if so, which description applies to you? NO. I am considered a \"new\" patient and must complete all patient intake questions. I am of child-bearing potential.     REVIEW OF SYSTEMS:  Have you recently had or currently have any of the following? Recent fever or chills? YES, past week   Any non-healing wound? No  Are you pregnant or planning to become pregnant? No  Are you currently or planning to be nursing or breast feeding? No   PAST MEDICAL HISTORY:  Have you personally ever had or currently have any of the following?  If \"YES,\" then please provide more detail. Skin cancer (such as Melanoma, Basal Cell Carcinoma, Squamous Cell Carcinoma?  No  Tuberculosis, HIV/AIDS, Hepatitis B or C: No  Radiation Treatment No   HISTORY OF IMMUNOSUPPRESSION:   Do you have a history of any of the following:  Systemic Immunosuppression such as Diabetes, Biologic or Immunotherapy, Chemotherapy, Organ Transplantation, Bone Marrow Transplantation or Prednsione?  YES,  Prednsione    Answering \"YES\" requires the addition of the dotphrase \"IMMUNOSUPPRESSED\" as the first diagnosis of the patient's visit.   FAMILY HISTORY:  Any \"first degree relatives\" (parent, brother, sister, or child) with the following?    Skin Cancer, Pancreatic or Other Cancer? YES, Mother has MM on the face   PATIENT EXPERIENCE:    Do you want the Dermatologist to perform a COMPLETE skin exam today including a clinical examination under the \"bra and underwear\" areas?  NO  If necessary, do we have your permission to call and leave a detailed message on your Preferred Phone number that includes your specific medical information?  Yes      Allergies[1] Current Medications[2]              Whom besides the patient is providing clinical information about today's encounter?   NO ADDITIONAL " HISTORIAN (patient alone provided history)    Physical Exam and Assessment/Plan by Diagnosis:        FOLLICULITIS    Physical Exam:  Anatomic Location: upper thigh  Morphologic Description:  Excoriated follicular papules  Pertinent Positives:  Surface pustules? No  Boils? No  Pertinent Negatives:    Additional History of Present Condition:  Patient is present for a rash that is located all over her body; she reports that it is worse on her thighs. She was seen by her PCP om 4/4/2025 for her rash, they prescribed her with Doxycycline, Medrol dose pack and kenalog cream to use when itchy. She notes that it is always present since Dec 2024. She notes that it helped a bit. Patient reports that her PCP thought it might have been folliculitis. She report that she changed  her body and cream to sensitive one instead. Patient is currently on Gayle 0.35 mg birth control.     Itchy? YES follow uo in 12 weeks  Recent chemical exposure (dyes, etc)? No  History of inflammatory skin conditions (lichen planus, discoid lupus)? No  History of herpes simplex? No  History of immunosuppression (HIV, cancer)? No  Recent use of topical steroids? YES  Recent use of corticosteroids, androgens (male hormones), or vemurafebin/dabrafenib? No    Plan:  Folliculitis results from inflammation of the hair follicles. It may be due to a variety of causes, including infection, irritation, and occlusion or blockage. Treatment is dependent on potential cause(s).  We discussed potential causes of folliculitis. These include bacterial, parasitic, or fungal infection, occlusion from moisturizers or ointments, topical steroids, and irritation from hair regrowth following shaving, waxing or plucking. Immunosuppression, certain medications, and inflammatory skin diseases (lichen planus, discoid lupus) may also cause folliculitis.  We discussed general treatment measures, including maintaining careful hygiene of the area. If folliculitis is due to  "irritation from hair removal, stop hair removal until the condition has settled. Use gentle hair removal methods and apply plenty of shaving gel if using a razor.  Start Doxycycline 100 mg, take twice day, once in the morning and once a night. Take with a glass of water and a meal  Start Benzoyl peroxide 5% wash on the face and body once daily in shower  Advisee to stop doxycycline when on vaction in [place] to due increase sun exposure- doxy can make patient photosensitive, restart when back home     PROCEDURES PERFORMED TODAY ASSOCIATED WITH THIS CONDITION:          NONE     Medical Complexity:    SELF-LIMITED OR MINOR PROBLEM.  Problem runs a definite and prescribed course, is transient in nature, and is not likely to permanently alter health status.             ACNE VULGARIS    Physical Exam:  Anatomic Locations Involved: Face  Global Assessment: ALMOST CLEAR: A few scattered comedones and a few small inflammatory papules.   Scarring Present? NONE  Pertinent Positives:  Pertinent Negatives:    Additional History of Present Condition:  present on exam        TODAY'S PLAN:     PRESCRIPTION MANAGEMENT:  Several treatment options were discussed including topical retinoids and their side effects.     Skin Hygiene:      Wash affected areas (face, chest, and back) TWICE A DAY with a mild cleanser such as CeraVe.  Use only mild cleansers (hypoallergenic and without fragrances) and fragrance free detergent (not \"unscented\" products which contain a masking agent); we discussed avoiding irritants/fragranced products.  Apply a good oil-free facial moisturizer AT LEAST TWO TIMES A DAY \" such as CeraVe.  Minimize the application of oils and cosmetics to the affected skin.  This includes HAIR PRODUCTS such as \"leave in\" conditioners.  Unless the product specifically states that it \"won't cause acne,\" \"won't clog pores,\" and/or \"is non-comedogenic\" then it may actually CAUSE acne.  If you smoke, STOP. Nicotine increases sebum " retention and increased scale within the follicles, forming comedones (blackheads and whiteheads).  Abrasive treatments such as dermabrasion and spa facials may aggravate inflammatory acne.  Do NOT scratch or pick your acne bumps.  The evidence that diet directly affects acne remains weak.  However, diet does affect your overall health.  Eat plenty of fresh fruit and vegetables.  Avoid protein or amino acid supplements, particularly if they contain leucine. Consider a low-glycemic, low-protein and low-dairy diet.  Be mindful that certain medications may cause of aggravate acne.  Make sure to tell your Dermatologist if you start a new prescription, nutritional supplement, and/or herbal remedy.      MORNING Topical Regimen:      Benzoyl Peroxide Wash:  Apply to skin while in shower/bath; gently lather into affected areas; leave on for 2-3 minutes; then, rinse completely.      EVENING Topical Regimen:      NONE.      SYSTEMIC Strategies:      ORAL Doxycycline (MONOhydrate preferred over HYCLATE)  WITH A FULL GLASS OF WATER:  Take 100 mg AFTER BREAKFAST  mg AFTER DINNER.  Do not lie down for at least 30 minutes after taking.  If you feel ill or overly tired, stop taking and call us immediately.  Practice excellent sun protection.        MEDICAL DECISION MAKING  Treatment Goal:  Resolution of the CHRONIC condition.       Chronic condition is NOT at treatment goal.  It is progressing along its expected course OR is poorly-controlled.             MULTIPLE MELANOCYTIC NEVI  Left lower back 1.5 cm hyperpigmemtned papule    -Relevant exam: Scattered over the trunk/extremities are homogenously pigmented brown macules and papules. ELM performed and without concerning findings.  - Exam and clinical history consistent with melanocytic nevi  - Educated on the ABCDE's of melanoma; handout provided  - Counseled to return to clinic prior to scheduled appointment should any of these lesions change or should any new lesions of  concern arise  - Counseled on use of sun protection daily. Reviewed latest FDA sunscreen guidelines, including use of broad spectrum (UVA and UVB blocking) sunscreen or sun protective clothing with SPF 30-50 every 2-3 hours and reapplied after exposure to water; use of photoprotective clothing, including a broad brim hat and UPF rated clothing if outdoors for several hours; avoid use of tanning beds as these pose significant risk for melanoma and skin cancer.  .  NO evidence of cutaneous malignancy on TBE       Scribe Attestation      I,:  Alison García MA am acting as a scribe while in the presence of the attending physician.:       I,:  Idalia Galarza MD personally performed the services described in this documentation    as scribed in my presence.:                   [1] No Known Allergies  [2]   Current Outpatient Medications:     norethindrone (Gayle) 0.35 MG tablet, Take 1 tablet (0.35 mg total) by mouth daily Do not start before January 1, 2024., Disp: 84 tablet, Rfl: 3    bisacodyl 5 mg EC tablet, Take 5 mg by mouth daily as needed for constipation (Patient not taking: Reported on 1/3/2025), Disp: , Rfl:     clotrimazole-betamethasone (LOTRISONE) 1-0.05 % cream, Apply topically 2 (two) times a day, Disp: 45 g, Rfl: 2    hydrocortisone (ANUSOL-HC) 2.5 % rectal cream, Apply topically 2 (two) times a day (Patient not taking: Reported on 1/3/2025), Disp: 28 g, Rfl: 0    hydrocortisone (ANUSOL-HC) 25 mg suppository, Insert 1 suppository (25 mg total) into the rectum 2 (two) times a day (Patient not taking: Reported on 1/3/2025), Disp: 12 suppository, Rfl: 0    methylPREDNISolone 4 MG tablet therapy pack, Use as directed on package (Patient not taking: Reported on 4/4/2025), Disp: 21 each, Rfl: 0    methylPREDNISolone 4 MG tablet therapy pack, Use as directed on package, Disp: 21 each, Rfl: 0    polyethylene glycol (MIRALAX) 17 g packet, 17 g (Patient not taking: Reported on 1/3/2025), Disp: ,  Rfl:     polyethylene glycol-electrolytes (TriLyte) 4000 mL solution, Take 4,000 mL by mouth once for 1 dose Take 4000 mL by mouth once for 1 dose. Use as directed, Disp: 4000 mL, Rfl: 0    triamcinolone (KENALOG) 0.1 % cream, Apply topically 2 (two) times a day, Disp: 453.6 g, Rfl: 0    Triamcinolone Acetonide (Nasacort Allergy) 55 MCG/ACT nasal spray, by Each Nare route daily (Patient not taking: Reported on 1/3/2025), Disp: , Rfl:

## 2025-06-04 RX ORDER — DOXYCYCLINE 100 MG/1
CAPSULE ORAL
Qty: 60 CAPSULE | Refills: 1 | Status: SHIPPED | OUTPATIENT
Start: 2025-06-04 | End: 2025-06-10

## 2025-06-04 RX ORDER — BENZOYL PEROXIDE 50 MG/ML
LIQUID TOPICAL
Qty: 148 ML | Refills: 2 | Status: SHIPPED | OUTPATIENT
Start: 2025-06-04

## 2025-06-09 ENCOUNTER — TELEPHONE (OUTPATIENT)
Age: 25
End: 2025-06-09

## 2025-06-09 NOTE — TELEPHONE ENCOUNTER
Patients mom called they were in the office on 6/3/25 and doxycycline was prescribed  patients mom said [facility] need [facility] for 3 month supply if its supposed to just be for a month then it has to be sent to [facility] pharmacy in [facility]

## 2025-06-09 NOTE — TELEPHONE ENCOUNTER
Message sent from patient's mother; needs Doxycycline sent to [facility] in [place] instead of [facility]

## 2025-06-10 DIAGNOSIS — L73.9 FOLLICULITIS: Primary | ICD-10-CM

## 2025-06-10 RX ORDER — DOXYCYCLINE 100 MG/1
100 CAPSULE ORAL 2 TIMES DAILY
Qty: 180 CAPSULE | Refills: 0 | Status: SHIPPED | OUTPATIENT
Start: 2025-06-10 | End: 2025-09-08

## 2025-08-13 ENCOUNTER — OFFICE VISIT (OUTPATIENT)
Dept: FAMILY MEDICINE CLINIC | Facility: CLINIC | Age: 25
End: 2025-08-13
Payer: COMMERCIAL

## 2025-08-15 ENCOUNTER — OFFICE VISIT (OUTPATIENT)
Dept: FAMILY MEDICINE CLINIC | Facility: CLINIC | Age: 25
End: 2025-08-15